# Patient Record
Sex: FEMALE | Race: WHITE | NOT HISPANIC OR LATINO | Employment: UNEMPLOYED | URBAN - NONMETROPOLITAN AREA
[De-identification: names, ages, dates, MRNs, and addresses within clinical notes are randomized per-mention and may not be internally consistent; named-entity substitution may affect disease eponyms.]

---

## 2020-06-05 ENCOUNTER — APPOINTMENT (OUTPATIENT)
Dept: MAMMOGRAPHY | Facility: HOSPITAL | Age: 60
End: 2020-06-05

## 2021-04-27 ENCOUNTER — HOSPITAL ENCOUNTER (EMERGENCY)
Facility: HOSPITAL | Age: 61
Discharge: HOME | End: 2021-04-27
Attending: FAMILY MEDICINE | Admitting: FAMILY MEDICINE

## 2021-04-27 ENCOUNTER — APPOINTMENT (OUTPATIENT)
Dept: GENERAL RADIOLOGY | Facility: HOSPITAL | Age: 61
End: 2021-04-27

## 2021-04-27 VITALS
DIASTOLIC BLOOD PRESSURE: 74 MMHG | HEIGHT: 64 IN | TEMPERATURE: 98.4 F | RESPIRATION RATE: 18 BRPM | HEART RATE: 68 BPM | WEIGHT: 196 LBS | SYSTOLIC BLOOD PRESSURE: 113 MMHG | BODY MASS INDEX: 33.46 KG/M2 | OXYGEN SATURATION: 99 %

## 2021-04-27 DIAGNOSIS — R07.89 CHEST WALL PAIN: Primary | ICD-10-CM

## 2021-04-27 LAB
ALBUMIN SERPL-MCNC: 4.39 G/DL (ref 3.5–5.2)
ALBUMIN/GLOB SERPL: 1.1 G/DL
ALP SERPL-CCNC: 71 U/L (ref 39–117)
ALT SERPL W P-5'-P-CCNC: 20 U/L (ref 1–33)
ANION GAP SERPL CALCULATED.3IONS-SCNC: 12.9 MMOL/L (ref 5–15)
APTT PPP: 32.5 SECONDS (ref 25.6–35.3)
AST SERPL-CCNC: 24 U/L (ref 1–32)
BASOPHILS # BLD AUTO: 0.08 10*3/MM3 (ref 0–0.2)
BASOPHILS NFR BLD AUTO: 1.2 % (ref 0–1.5)
BILIRUB SERPL-MCNC: 0.3 MG/DL (ref 0–1.2)
BUN SERPL-MCNC: 12 MG/DL (ref 8–23)
BUN/CREAT SERPL: 11.8 (ref 7–25)
CALCIUM SPEC-SCNC: 9.6 MG/DL (ref 8.6–10.5)
CHLORIDE SERPL-SCNC: 103 MMOL/L (ref 98–107)
CO2 SERPL-SCNC: 24.1 MMOL/L (ref 22–29)
CREAT SERPL-MCNC: 1.02 MG/DL (ref 0.57–1)
CRP SERPL-MCNC: 0.66 MG/DL (ref 0–0.5)
DEPRECATED RDW RBC AUTO: 44.7 FL (ref 37–54)
EOSINOPHIL # BLD AUTO: 0.13 10*3/MM3 (ref 0–0.4)
EOSINOPHIL NFR BLD AUTO: 2 % (ref 0.3–6.2)
ERYTHROCYTE [DISTWIDTH] IN BLOOD BY AUTOMATED COUNT: 13.9 % (ref 12.3–15.4)
GFR SERPL CREATININE-BSD FRML MDRD: 55 ML/MIN/1.73
GLOBULIN UR ELPH-MCNC: 4 GM/DL
GLUCOSE SERPL-MCNC: 122 MG/DL (ref 65–99)
HCT VFR BLD AUTO: 43.1 % (ref 34–46.6)
HGB BLD-MCNC: 13.4 G/DL (ref 12–15.9)
IMM GRANULOCYTES # BLD AUTO: 0.04 10*3/MM3 (ref 0–0.05)
IMM GRANULOCYTES NFR BLD AUTO: 0.6 % (ref 0–0.5)
INR PPP: 1.01 (ref 0.9–1.1)
LYMPHOCYTES # BLD AUTO: 2.11 10*3/MM3 (ref 0.7–3.1)
LYMPHOCYTES NFR BLD AUTO: 31.7 % (ref 19.6–45.3)
MCH RBC QN AUTO: 27.4 PG (ref 26.6–33)
MCHC RBC AUTO-ENTMCNC: 31.1 G/DL (ref 31.5–35.7)
MCV RBC AUTO: 88.1 FL (ref 79–97)
MONOCYTES # BLD AUTO: 0.37 10*3/MM3 (ref 0.1–0.9)
MONOCYTES NFR BLD AUTO: 5.6 % (ref 5–12)
NEUTROPHILS NFR BLD AUTO: 3.93 10*3/MM3 (ref 1.7–7)
NEUTROPHILS NFR BLD AUTO: 58.9 % (ref 42.7–76)
NRBC BLD AUTO-RTO: 0 /100 WBC (ref 0–0.2)
PLATELET # BLD AUTO: 376 10*3/MM3 (ref 140–450)
PMV BLD AUTO: 10.8 FL (ref 6–12)
POTASSIUM SERPL-SCNC: 4.1 MMOL/L (ref 3.5–5.2)
PROT SERPL-MCNC: 8.4 G/DL (ref 6–8.5)
PROTHROMBIN TIME: 13.1 SECONDS (ref 11.9–14.1)
RBC # BLD AUTO: 4.89 10*6/MM3 (ref 3.77–5.28)
SODIUM SERPL-SCNC: 140 MMOL/L (ref 136–145)
TROPONIN T SERPL-MCNC: <0.01 NG/ML (ref 0–0.03)
TROPONIN T SERPL-MCNC: <0.01 NG/ML (ref 0–0.03)
WBC # BLD AUTO: 6.66 10*3/MM3 (ref 3.4–10.8)

## 2021-04-27 PROCEDURE — 84484 ASSAY OF TROPONIN QUANT: CPT | Performed by: NURSE PRACTITIONER

## 2021-04-27 PROCEDURE — 80053 COMPREHEN METABOLIC PANEL: CPT | Performed by: NURSE PRACTITIONER

## 2021-04-27 PROCEDURE — 36415 COLL VENOUS BLD VENIPUNCTURE: CPT

## 2021-04-27 PROCEDURE — 71045 X-RAY EXAM CHEST 1 VIEW: CPT

## 2021-04-27 PROCEDURE — 86140 C-REACTIVE PROTEIN: CPT | Performed by: NURSE PRACTITIONER

## 2021-04-27 PROCEDURE — 71045 X-RAY EXAM CHEST 1 VIEW: CPT | Performed by: RADIOLOGY

## 2021-04-27 PROCEDURE — 85730 THROMBOPLASTIN TIME PARTIAL: CPT | Performed by: NURSE PRACTITIONER

## 2021-04-27 PROCEDURE — 93005 ELECTROCARDIOGRAM TRACING: CPT | Performed by: NURSE PRACTITIONER

## 2021-04-27 PROCEDURE — 99284 EMERGENCY DEPT VISIT MOD MDM: CPT

## 2021-04-27 PROCEDURE — 85610 PROTHROMBIN TIME: CPT | Performed by: NURSE PRACTITIONER

## 2021-04-27 PROCEDURE — 85025 COMPLETE CBC W/AUTO DIFF WBC: CPT | Performed by: NURSE PRACTITIONER

## 2021-04-27 RX ORDER — ASPIRIN 81 MG/1
324 TABLET, CHEWABLE ORAL ONCE
Status: COMPLETED | OUTPATIENT
Start: 2021-04-27 | End: 2021-04-27

## 2021-04-27 RX ORDER — SIMVASTATIN 20 MG
20 TABLET ORAL NIGHTLY
COMMUNITY
End: 2023-03-14

## 2021-04-27 RX ORDER — SODIUM CHLORIDE 0.9 % (FLUSH) 0.9 %
10 SYRINGE (ML) INJECTION AS NEEDED
Status: DISCONTINUED | OUTPATIENT
Start: 2021-04-27 | End: 2021-04-27 | Stop reason: HOSPADM

## 2021-04-27 RX ORDER — HYDROCODONE BITARTRATE AND ACETAMINOPHEN 5; 325 MG/1; MG/1
1 TABLET ORAL EVERY 6 HOURS PRN
COMMUNITY
End: 2021-12-07

## 2021-04-27 RX ORDER — MONTELUKAST SODIUM 10 MG/1
10 TABLET ORAL NIGHTLY
COMMUNITY

## 2021-04-27 RX ORDER — PROPRANOLOL HYDROCHLORIDE 20 MG/1
20 TABLET ORAL 3 TIMES DAILY
COMMUNITY

## 2021-04-27 RX ORDER — VORTIOXETINE 10 MG/1
1 TABLET, FILM COATED ORAL DAILY
COMMUNITY
End: 2023-03-14 | Stop reason: SDUPTHER

## 2021-04-27 RX ORDER — HYDROCODONE BITARTRATE AND ACETAMINOPHEN 5; 325 MG/1; MG/1
1 TABLET ORAL EVERY 6 HOURS PRN
Qty: 10 TABLET | Refills: 0 | Status: SHIPPED | OUTPATIENT
Start: 2021-04-27 | End: 2021-12-07

## 2021-04-27 RX ADMIN — ASPIRIN 324 MG: 81 TABLET, CHEWABLE ORAL at 10:23

## 2021-08-12 ENCOUNTER — TRANSCRIBE ORDERS (OUTPATIENT)
Dept: ADMINISTRATIVE | Facility: HOSPITAL | Age: 61
End: 2021-08-12

## 2021-08-12 DIAGNOSIS — R10.11 ABDOMINAL PAIN, RIGHT UPPER QUADRANT: Primary | ICD-10-CM

## 2021-08-20 ENCOUNTER — APPOINTMENT (OUTPATIENT)
Dept: ULTRASOUND IMAGING | Facility: HOSPITAL | Age: 61
End: 2021-08-20

## 2021-09-15 ENCOUNTER — HOSPITAL ENCOUNTER (OUTPATIENT)
Dept: ULTRASOUND IMAGING | Facility: HOSPITAL | Age: 61
Discharge: HOME | End: 2021-09-15
Admitting: NURSE PRACTITIONER

## 2021-09-15 DIAGNOSIS — R10.11 ABDOMINAL PAIN, RIGHT UPPER QUADRANT: ICD-10-CM

## 2021-09-15 PROCEDURE — 76705 ECHO EXAM OF ABDOMEN: CPT

## 2021-09-15 PROCEDURE — 76705 ECHO EXAM OF ABDOMEN: CPT | Performed by: RADIOLOGY

## 2021-09-28 ENCOUNTER — TRANSCRIBE ORDERS (OUTPATIENT)
Dept: ADMINISTRATIVE | Facility: HOSPITAL | Age: 61
End: 2021-09-28

## 2021-09-28 DIAGNOSIS — R10.11 ABDOMINAL PAIN, RIGHT UPPER QUADRANT: Primary | ICD-10-CM

## 2021-10-20 ENCOUNTER — HOSPITAL ENCOUNTER (OUTPATIENT)
Dept: NUCLEAR MEDICINE | Facility: HOSPITAL | Age: 61
Discharge: HOME | End: 2021-10-20

## 2021-10-20 DIAGNOSIS — R10.11 ABDOMINAL PAIN, RIGHT UPPER QUADRANT: ICD-10-CM

## 2021-10-20 PROCEDURE — A9537 TC99M MEBROFENIN: HCPCS | Performed by: NURSE PRACTITIONER

## 2021-10-20 PROCEDURE — 78226 HEPATOBILIARY SYSTEM IMAGING: CPT | Performed by: RADIOLOGY

## 2021-10-20 PROCEDURE — 0 TECHNETIUM TC 99M MEBROFENIN KIT: Performed by: NURSE PRACTITIONER

## 2021-10-20 PROCEDURE — 78226 HEPATOBILIARY SYSTEM IMAGING: CPT

## 2021-10-20 RX ORDER — KIT FOR THE PREPARATION OF TECHNETIUM TC 99M MEBROFENIN 45 MG/10ML
1 INJECTION, POWDER, LYOPHILIZED, FOR SOLUTION INTRAVENOUS
Status: COMPLETED | OUTPATIENT
Start: 2021-10-20 | End: 2021-10-20

## 2021-10-20 RX ADMIN — MEBROFENIN 1 DOSE: 45 INJECTION, POWDER, LYOPHILIZED, FOR SOLUTION INTRAVENOUS at 07:03

## 2021-11-16 ENCOUNTER — OFFICE VISIT (OUTPATIENT)
Dept: SURGERY | Facility: CLINIC | Age: 61
End: 2021-11-16

## 2021-11-16 VITALS
HEART RATE: 64 BPM | SYSTOLIC BLOOD PRESSURE: 132 MMHG | BODY MASS INDEX: 32.27 KG/M2 | DIASTOLIC BLOOD PRESSURE: 92 MMHG | WEIGHT: 189 LBS | HEIGHT: 64 IN

## 2021-11-16 DIAGNOSIS — K82.8 BILIARY DYSKINESIA: Primary | ICD-10-CM

## 2021-11-16 PROCEDURE — 99203 OFFICE O/P NEW LOW 30 MIN: CPT | Performed by: SURGERY

## 2021-11-16 RX ORDER — FAMOTIDINE 10 MG
10 TABLET ORAL 2 TIMES DAILY
COMMUNITY
End: 2023-03-14

## 2021-11-16 RX ORDER — INDOCYANINE GREEN AND WATER 25 MG
2.5 KIT INJECTION ONCE
Status: CANCELLED | OUTPATIENT
Start: 2021-11-16 | End: 2021-11-16

## 2021-11-16 RX ORDER — LOSARTAN POTASSIUM 25 MG/1
25 TABLET ORAL DAILY
Status: ON HOLD | COMMUNITY
Start: 2021-11-13 | End: 2021-12-09

## 2021-11-16 NOTE — PROGRESS NOTES
Subjective   Rosemarie Wright is a 61 y.o. female is being seen for consultation today at the request of Carey Angeles APRN    Rosemarie Wright is a 61 y.o. female With right upper quadrant pain after fatty and greasy meals.  Symptoms been present intermittently for 1 year.  Symptoms are increasing in frequency and duration.  No signs of cholecystitis.  HIDA scan shows decreased ejection fraction below 35%.      Past Medical History:   Diagnosis Date   • Anxiety    • Hyperlipidemia    • Hypertension    • Migraine    • Sleep apnea        History reviewed. No pertinent family history.    Social History     Socioeconomic History   • Marital status:    Tobacco Use   • Smoking status: Never Smoker   • Smokeless tobacco: Never Used   Vaping Use   • Vaping Use: Never used   Substance and Sexual Activity   • Alcohol use: Never   • Drug use: Never   • Sexual activity: Defer       History reviewed. No pertinent surgical history.    Review of Systems   Constitutional: Negative for activity change, appetite change, chills and fever.   HENT: Negative for sore throat and trouble swallowing.    Eyes: Negative for visual disturbance.   Respiratory: Negative for cough and shortness of breath.    Cardiovascular: Negative for chest pain and palpitations.   Gastrointestinal: Positive for abdominal pain and nausea. Negative for abdominal distention, blood in stool, constipation, diarrhea and vomiting.   Endocrine: Negative for cold intolerance and heat intolerance.   Genitourinary: Negative for dysuria.   Musculoskeletal: Negative for joint swelling.   Skin: Negative for color change, rash and wound.   Allergic/Immunologic: Negative for immunocompromised state.   Neurological: Negative for dizziness, seizures, weakness and headaches.   Hematological: Negative for adenopathy. Does not bruise/bleed easily.   Psychiatric/Behavioral: Negative for agitation and confusion.         /92   Pulse 64   Ht 162.6 cm  "(64.02\")   Wt 85.7 kg (189 lb)   BMI 32.43 kg/m²   Objective   Physical Exam  Constitutional:       Appearance: She is well-developed.   HENT:      Head: Normocephalic and atraumatic.   Eyes:      Conjunctiva/sclera: Conjunctivae normal.      Pupils: Pupils are equal, round, and reactive to light.   Neck:      Thyroid: No thyromegaly.      Vascular: No JVD.      Trachea: No tracheal deviation.   Cardiovascular:      Rate and Rhythm: Normal rate and regular rhythm.      Heart sounds: No murmur heard.  No friction rub. No gallop.    Pulmonary:      Effort: Pulmonary effort is normal.      Breath sounds: Normal breath sounds.   Abdominal:      General: There is no distension.      Palpations: Abdomen is soft. There is no hepatomegaly or splenomegaly.      Tenderness: There is no abdominal tenderness.      Hernia: No hernia is present.   Musculoskeletal:         General: No deformity. Normal range of motion.      Cervical back: Neck supple.   Skin:     General: Skin is warm and dry.   Neurological:      Mental Status: She is alert and oriented to person, place, and time.               Assessment   Diagnoses and all orders for this visit:    1. Biliary dyskinesia (Primary)  -     Case Request; Standing  -     COVID PRE-OP / PRE-PROCEDURE SCREENING ORDER (NO ISOLATION) - Swab, Nasopharynx; Future  -     CBC & Differential; Future  -     Comprehensive Metabolic Panel; Future  -     Case Request    Other orders  -     Follow Anesthesia Guidelines / Standing Orders; Future  -     Provide NPO Instructions to Patient; Future  -     Chlorhexidine Skin Prep; Future      Rosemarie Edwards Kyle is a 61 y.o. female with biliary dyskinesia.  Risk and benefits have been discussed with the patient and she will proceed with cholecystectomy.    Patient's Body mass index is 32.43 kg/m². indicating that she is within normal range (BMI 18.5-24.9). No BMI management plan needed..             "

## 2021-12-02 ENCOUNTER — TELEPHONE (OUTPATIENT)
Dept: SURGERY | Facility: CLINIC | Age: 61
End: 2021-12-02

## 2021-12-02 NOTE — TELEPHONE ENCOUNTER
[facility] Call Center    Phone Message    May a detailed message be left on voicemail: yes     Reason for Call: Medication Question or concern regarding medication   Prescription Clarification  Name of Medication: Gabapentin  Prescribing Provider:    What on the order needs clarification? Pt's group home nurse called to clarify the dosage for the gabapentin          Action Taken: Other: [facility]    Travel Screening: Not Applicable                                                                                                                                  Talked with patient about surgery time preop and covid testing , patient voiced understanding.

## 2021-12-07 ENCOUNTER — LAB (OUTPATIENT)
Dept: LAB | Facility: HOSPITAL | Age: 61
End: 2021-12-07

## 2021-12-07 ENCOUNTER — PRE-ADMISSION TESTING (OUTPATIENT)
Dept: PREADMISSION TESTING | Facility: HOSPITAL | Age: 61
End: 2021-12-07

## 2021-12-07 DIAGNOSIS — K82.8 BILIARY DYSKINESIA: ICD-10-CM

## 2021-12-07 LAB
ALBUMIN SERPL-MCNC: 4.6 G/DL (ref 3.5–5.2)
ALBUMIN/GLOB SERPL: 1.2 G/DL
ALP SERPL-CCNC: 74 U/L (ref 39–117)
ALT SERPL W P-5'-P-CCNC: 14 U/L (ref 1–33)
ANION GAP SERPL CALCULATED.3IONS-SCNC: 12.8 MMOL/L (ref 5–15)
AST SERPL-CCNC: 18 U/L (ref 1–32)
BASOPHILS # BLD AUTO: 0.12 10*3/MM3 (ref 0–0.2)
BASOPHILS NFR BLD AUTO: 1.4 % (ref 0–1.5)
BILIRUB SERPL-MCNC: 0.3 MG/DL (ref 0–1.2)
BUN SERPL-MCNC: 15 MG/DL (ref 8–23)
BUN/CREAT SERPL: 14.7 (ref 7–25)
CALCIUM SPEC-SCNC: 10.1 MG/DL (ref 8.6–10.5)
CHLORIDE SERPL-SCNC: 101 MMOL/L (ref 98–107)
CO2 SERPL-SCNC: 26.2 MMOL/L (ref 22–29)
CREAT SERPL-MCNC: 1.02 MG/DL (ref 0.57–1)
DEPRECATED RDW RBC AUTO: 43.1 FL (ref 37–54)
EOSINOPHIL # BLD AUTO: 0.21 10*3/MM3 (ref 0–0.4)
EOSINOPHIL NFR BLD AUTO: 2.4 % (ref 0.3–6.2)
ERYTHROCYTE [DISTWIDTH] IN BLOOD BY AUTOMATED COUNT: 13.2 % (ref 12.3–15.4)
GFR SERPL CREATININE-BSD FRML MDRD: 55 ML/MIN/1.73
GLOBULIN UR ELPH-MCNC: 3.7 GM/DL
GLUCOSE SERPL-MCNC: 77 MG/DL (ref 65–99)
HCT VFR BLD AUTO: 41.9 % (ref 34–46.6)
HGB BLD-MCNC: 13.4 G/DL (ref 12–15.9)
IMM GRANULOCYTES # BLD AUTO: 0.02 10*3/MM3 (ref 0–0.05)
IMM GRANULOCYTES NFR BLD AUTO: 0.2 % (ref 0–0.5)
LYMPHOCYTES # BLD AUTO: 2.41 10*3/MM3 (ref 0.7–3.1)
LYMPHOCYTES NFR BLD AUTO: 27.5 % (ref 19.6–45.3)
MCH RBC QN AUTO: 28.3 PG (ref 26.6–33)
MCHC RBC AUTO-ENTMCNC: 32 G/DL (ref 31.5–35.7)
MCV RBC AUTO: 88.4 FL (ref 79–97)
MONOCYTES # BLD AUTO: 0.55 10*3/MM3 (ref 0.1–0.9)
MONOCYTES NFR BLD AUTO: 6.3 % (ref 5–12)
NEUTROPHILS NFR BLD AUTO: 5.45 10*3/MM3 (ref 1.7–7)
NEUTROPHILS NFR BLD AUTO: 62.2 % (ref 42.7–76)
NRBC BLD AUTO-RTO: 0 /100 WBC (ref 0–0.2)
PLATELET # BLD AUTO: 352 10*3/MM3 (ref 140–450)
PMV BLD AUTO: 11.6 FL (ref 6–12)
POTASSIUM SERPL-SCNC: 3.8 MMOL/L (ref 3.5–5.2)
PROT SERPL-MCNC: 8.3 G/DL (ref 6–8.5)
RBC # BLD AUTO: 4.74 10*6/MM3 (ref 3.77–5.28)
SODIUM SERPL-SCNC: 140 MMOL/L (ref 136–145)
WBC NRBC COR # BLD: 8.76 10*3/MM3 (ref 3.4–10.8)

## 2021-12-07 PROCEDURE — 85025 COMPLETE CBC W/AUTO DIFF WBC: CPT

## 2021-12-07 PROCEDURE — C9803 HOPD COVID-19 SPEC COLLECT: HCPCS

## 2021-12-07 PROCEDURE — 93005 ELECTROCARDIOGRAM TRACING: CPT

## 2021-12-07 PROCEDURE — 36415 COLL VENOUS BLD VENIPUNCTURE: CPT

## 2021-12-07 PROCEDURE — 93010 ELECTROCARDIOGRAM REPORT: CPT | Performed by: INTERNAL MEDICINE

## 2021-12-07 PROCEDURE — 80053 COMPREHEN METABOLIC PANEL: CPT

## 2021-12-07 PROCEDURE — U0004 COV-19 TEST NON-CDC HGH THRU: HCPCS | Performed by: SURGERY

## 2021-12-07 NOTE — DISCHARGE INSTRUCTIONS
TAKE the following medications the morning of surgery:    All heart or blood pressure medications    Please discontinue all blood thinners and anticoagulants (except aspirin) prior to surgery as per your surgeon and cardiologist instructions.  Aspirin may be continued up to the day prior to surgery.    HOLD all diabetic medications the morning of surgery as order by physician.    Please follow instructions on use of prep cloths provided by nurse. Return instruction sheet to pre-op nurse on day of surgery.    Arrival time for surgery on 12/9/21 will be given to you by Dr. Wong's office.    A RESPONSIBLE PERSON MUST REMAIN IN THE WAITING ROOM DURING YOUR PROCEDURE AND A RESPONSIBLE  MUST BE AVAILABLE UPON YOUR DISCHARGE.    General Instructions:  • Do NOT eat or drink after midnight 12/8/21 which includes water, mints, or gum.  • You may brush your teeth. Dental appliances that are removable must be taken out day of surgery.  • Do NOT smoke, chew tobacco, or drink alcohol within 24 hours prior to surgery.  • Bring medications in original bottles, any inhalers and if applicable your C-PAP/BI-PAP machine  • Bring any papers given to you in the doctor’s office  • Wear clean, comfortable clothes and socks  • Do NOT wear contact lenses or make-up or dark nail polish.  Bring a case for your glasses if applicable.  • Bring crutches or walker if applicable  • Leave all other valuables and jewelry at home  • If you were given a blood bank armband, continue to wear it until discharged.    Preventing a Surgical Site Infection:  • Shower the night before surgery (unless instructed otherwise) using a fresh bar of anti-bacterial soap (such as Dial) and clean washcloth.  Dry with a clean towel and dress in clean clothing.  • For 2 to 3 days before surgery, avoid shaving with a razor near where you will have surgery because the razor can irritate skin and make it easier to develop an infection.  Ask your surgeon if you will be  receiving antibiotics prior to surgery.  • Make sure you, your family, and all healthcare providers clean their hands with soap and water or an alcohol-based hand  before caring for you or your wound.  • If at all possible, quit smoking as many days before surgery as you can.    Day of Surgery:  Upon arrival, a pre-op nurse and anesthesiologist will review your health history, obtain vital signs, and answer questions you may have.  The only belongings needed at this time will be your home medications and if applicable you C-PAP/BI-PAP machine.  If you are staying overnight, your family can leave the rest of your belongings in the car and bring them to your room later.  A pre-op nurse will start an IV and you may receive medication in preparation for surgery.  Due to patient privacy and limited space, only one member of your family will be able to accompany you in the pre-op area.  While you are in surgery your family should notify the waiting room  if they leave the waiting room area and provide a contact number.  Please be aware that surgery does come with discomfort.  We want to make every effort to control your discomfort so please discuss any uncontrolled symptoms with your nurse.  Your doctor will most likely have prescribed pain medications.  If you are going home after surgery you will receive individualized written care instructions before being discharged.  A responsible adult must drive you to and from the hospital on the day of surgery and stay with you for 24 hours.  If you are staying overnight following surgery, you will be transported to your hospital room following the recovery period.

## 2021-12-08 LAB
QT INTERVAL: 436 MS
QTC INTERVAL: 470 MS
SARS-COV-2 RNA PNL SPEC NAA+PROBE: NOT DETECTED

## 2021-12-09 ENCOUNTER — HOSPITAL ENCOUNTER (OUTPATIENT)
Facility: HOSPITAL | Age: 61
Discharge: HOME | End: 2021-12-10
Attending: SURGERY | Admitting: SURGERY

## 2021-12-09 ENCOUNTER — ANESTHESIA (OUTPATIENT)
Dept: PERIOP | Facility: HOSPITAL | Age: 61
End: 2021-12-09

## 2021-12-09 ENCOUNTER — APPOINTMENT (OUTPATIENT)
Dept: GENERAL RADIOLOGY | Facility: HOSPITAL | Age: 61
End: 2021-12-09

## 2021-12-09 ENCOUNTER — ANESTHESIA EVENT (OUTPATIENT)
Dept: PERIOP | Facility: HOSPITAL | Age: 61
End: 2021-12-09

## 2021-12-09 DIAGNOSIS — K82.8 BILIARY DYSKINESIA: ICD-10-CM

## 2021-12-09 LAB
A-A DO2: 15.5 MMHG (ref 0–300)
ARTERIAL PATENCY WRIST A: POSITIVE
ATMOSPHERIC PRESS: 725 MMHG
BASE EXCESS BLDA CALC-SCNC: -1.1 MMOL/L (ref 0–2)
BDY SITE: ABNORMAL
BODY TEMPERATURE: 0 C
CO2 BLDA-SCNC: 24.4 MMOL/L (ref 22–33)
COHGB MFR BLD: 0.6 % (ref 0–5)
GAS FLOW AIRWAY: 2 LPM
HCO3 BLDA-SCNC: 23.2 MMOL/L (ref 20–26)
HCT VFR BLD CALC: 37.1 % (ref 38–51)
HGB BLDA-MCNC: 12.1 G/DL (ref 13.5–17.5)
INHALED O2 CONCENTRATION: 28 %
Lab: ABNORMAL
METHGB BLD QL: 0.1 % (ref 0–3)
MODALITY: ABNORMAL
NOTE: ABNORMAL
OXYHGB MFR BLDV: 98.7 % (ref 94–99)
PCO2 BLDA: 36.9 MM HG (ref 35–45)
PCO2 TEMP ADJ BLD: ABNORMAL MM[HG]
PH BLDA: 7.41 PH UNITS (ref 7.35–7.45)
PH, TEMP CORRECTED: ABNORMAL
PO2 BLDA: 133 MM HG (ref 83–108)
PO2 TEMP ADJ BLD: ABNORMAL MM[HG]
SAO2 % BLDCOA: >99.2 % (ref 94–99)
VENTILATOR MODE: ABNORMAL

## 2021-12-09 PROCEDURE — 25010000002 HYDROMORPHONE PER 4 MG: Performed by: NURSE ANESTHETIST, CERTIFIED REGISTERED

## 2021-12-09 PROCEDURE — 25010000002 PROPOFOL 10 MG/ML EMULSION: Performed by: NURSE ANESTHETIST, CERTIFIED REGISTERED

## 2021-12-09 PROCEDURE — 25010000002 BUPRENORPHINE PER 0.1 MG: Performed by: NURSE ANESTHETIST, CERTIFIED REGISTERED

## 2021-12-09 PROCEDURE — 25010000002 ROPIVACAINE PER 1 MG: Performed by: NURSE ANESTHETIST, CERTIFIED REGISTERED

## 2021-12-09 PROCEDURE — 25010000002 FENTANYL CITRATE (PF) 50 MCG/ML SOLUTION: Performed by: NURSE ANESTHETIST, CERTIFIED REGISTERED

## 2021-12-09 PROCEDURE — 0 AMPICILLIN-SULBACTAM PER 1.5 G: Performed by: SURGERY

## 2021-12-09 PROCEDURE — 25010000002 MIDAZOLAM PER 1 MG: Performed by: ANESTHESIOLOGY

## 2021-12-09 PROCEDURE — 82805 BLOOD GASES W/O2 SATURATION: CPT

## 2021-12-09 PROCEDURE — G0378 HOSPITAL OBSERVATION PER HR: HCPCS

## 2021-12-09 PROCEDURE — 82375 ASSAY CARBOXYHB QUANT: CPT

## 2021-12-09 PROCEDURE — 25010000002 NALOXONE PER 1 MG: Performed by: ANESTHESIOLOGY

## 2021-12-09 PROCEDURE — 36600 WITHDRAWAL OF ARTERIAL BLOOD: CPT

## 2021-12-09 PROCEDURE — 83050 HGB METHEMOGLOBIN QUAN: CPT

## 2021-12-09 PROCEDURE — 25010000002 NEOSTIGMINE 10 MG/10ML SOLUTION: Performed by: NURSE ANESTHETIST, CERTIFIED REGISTERED

## 2021-12-09 PROCEDURE — 25010000002 KETOROLAC TROMETHAMINE PER 15 MG: Performed by: NURSE ANESTHETIST, CERTIFIED REGISTERED

## 2021-12-09 PROCEDURE — 25010000002 MIDAZOLAM PER 1 MG: Performed by: NURSE ANESTHETIST, CERTIFIED REGISTERED

## 2021-12-09 PROCEDURE — 25010000002 ONDANSETRON PER 1 MG: Performed by: NURSE ANESTHETIST, CERTIFIED REGISTERED

## 2021-12-09 PROCEDURE — 47562 LAPAROSCOPIC CHOLECYSTECTOMY: CPT | Performed by: SURGERY

## 2021-12-09 PROCEDURE — 71045 X-RAY EXAM CHEST 1 VIEW: CPT

## 2021-12-09 PROCEDURE — C1889 IMPLANT/INSERT DEVICE, NOC: HCPCS | Performed by: SURGERY

## 2021-12-09 PROCEDURE — 25010000002 DROPERIDOL PER 5 MG: Performed by: NURSE ANESTHETIST, CERTIFIED REGISTERED

## 2021-12-09 PROCEDURE — 25010000002 DEXAMETHASONE PER 1 MG: Performed by: NURSE ANESTHETIST, CERTIFIED REGISTERED

## 2021-12-09 DEVICE — HEMOLOK L 6 CLIPS/CART
Type: IMPLANTABLE DEVICE | Site: ABDOMEN | Status: FUNCTIONAL
Brand: WECK

## 2021-12-09 RX ORDER — FENTANYL CITRATE 50 UG/ML
50 INJECTION, SOLUTION INTRAMUSCULAR; INTRAVENOUS
Status: DISCONTINUED | OUTPATIENT
Start: 2021-12-09 | End: 2021-12-09 | Stop reason: HOSPADM

## 2021-12-09 RX ORDER — MEPERIDINE HYDROCHLORIDE 25 MG/ML
12.5 INJECTION INTRAMUSCULAR; INTRAVENOUS; SUBCUTANEOUS
Status: DISCONTINUED | OUTPATIENT
Start: 2021-12-09 | End: 2021-12-09 | Stop reason: HOSPADM

## 2021-12-09 RX ORDER — KETOROLAC TROMETHAMINE 30 MG/ML
30 INJECTION, SOLUTION INTRAMUSCULAR; INTRAVENOUS EVERY 6 HOURS PRN
Status: COMPLETED | OUTPATIENT
Start: 2021-12-09 | End: 2021-12-09

## 2021-12-09 RX ORDER — PROPOFOL 10 MG/ML
VIAL (ML) INTRAVENOUS AS NEEDED
Status: DISCONTINUED | OUTPATIENT
Start: 2021-12-09 | End: 2021-12-09 | Stop reason: SURG

## 2021-12-09 RX ORDER — CYCLOBENZAPRINE HCL 10 MG
5 TABLET ORAL 3 TIMES DAILY PRN
Status: DISCONTINUED | OUTPATIENT
Start: 2021-12-09 | End: 2021-12-10 | Stop reason: HOSPADM

## 2021-12-09 RX ORDER — OXYCODONE HYDROCHLORIDE AND ACETAMINOPHEN 5; 325 MG/1; MG/1
1 TABLET ORAL ONCE AS NEEDED
Status: COMPLETED | OUTPATIENT
Start: 2021-12-09 | End: 2021-12-09

## 2021-12-09 RX ORDER — SODIUM CHLORIDE 0.9 % (FLUSH) 0.9 %
10 SYRINGE (ML) INJECTION EVERY 12 HOURS SCHEDULED
Status: DISCONTINUED | OUTPATIENT
Start: 2021-12-09 | End: 2021-12-09 | Stop reason: HOSPADM

## 2021-12-09 RX ORDER — ONDANSETRON 2 MG/ML
4 INJECTION INTRAMUSCULAR; INTRAVENOUS EVERY 6 HOURS PRN
Status: DISCONTINUED | OUTPATIENT
Start: 2021-12-09 | End: 2021-12-10 | Stop reason: HOSPADM

## 2021-12-09 RX ORDER — SODIUM CHLORIDE, SODIUM LACTATE, POTASSIUM CHLORIDE, CALCIUM CHLORIDE 600; 310; 30; 20 MG/100ML; MG/100ML; MG/100ML; MG/100ML
100 INJECTION, SOLUTION INTRAVENOUS ONCE AS NEEDED
Status: DISCONTINUED | OUTPATIENT
Start: 2021-12-09 | End: 2021-12-09 | Stop reason: HOSPADM

## 2021-12-09 RX ORDER — ROPIVACAINE HYDROCHLORIDE 5 MG/ML
INJECTION, SOLUTION EPIDURAL; INFILTRATION; PERINEURAL
Status: COMPLETED | OUTPATIENT
Start: 2021-12-09 | End: 2021-12-09

## 2021-12-09 RX ORDER — IBUPROFEN 600 MG/1
600 TABLET ORAL EVERY 4 HOURS PRN
Status: DISCONTINUED | OUTPATIENT
Start: 2021-12-09 | End: 2021-12-10 | Stop reason: HOSPADM

## 2021-12-09 RX ORDER — INDOCYANINE GREEN AND WATER 25 MG
2.5 KIT INJECTION ONCE
Status: COMPLETED | OUTPATIENT
Start: 2021-12-09 | End: 2021-12-09

## 2021-12-09 RX ORDER — SODIUM CHLORIDE, SODIUM LACTATE, POTASSIUM CHLORIDE, CALCIUM CHLORIDE 600; 310; 30; 20 MG/100ML; MG/100ML; MG/100ML; MG/100ML
125 INJECTION, SOLUTION INTRAVENOUS ONCE
Status: COMPLETED | OUTPATIENT
Start: 2021-12-09 | End: 2021-12-09

## 2021-12-09 RX ORDER — MIDAZOLAM HYDROCHLORIDE 1 MG/ML
INJECTION INTRAMUSCULAR; INTRAVENOUS AS NEEDED
Status: DISCONTINUED | OUTPATIENT
Start: 2021-12-09 | End: 2021-12-09 | Stop reason: SURG

## 2021-12-09 RX ORDER — FAMOTIDINE 20 MG/1
10 TABLET, FILM COATED ORAL 2 TIMES DAILY
Status: DISCONTINUED | OUTPATIENT
Start: 2021-12-10 | End: 2021-12-10 | Stop reason: HOSPADM

## 2021-12-09 RX ORDER — NALOXONE HYDROCHLORIDE 0.4 MG/ML
0.1 INJECTION, SOLUTION INTRAMUSCULAR; INTRAVENOUS; SUBCUTANEOUS ONCE
Status: COMPLETED | OUTPATIENT
Start: 2021-12-09 | End: 2021-12-09

## 2021-12-09 RX ORDER — ACETAMINOPHEN 325 MG/1
650 TABLET ORAL EVERY 4 HOURS PRN
Status: DISCONTINUED | OUTPATIENT
Start: 2021-12-09 | End: 2021-12-10 | Stop reason: HOSPADM

## 2021-12-09 RX ORDER — ACETAMINOPHEN 325 MG/1
650 TABLET ORAL EVERY 4 HOURS PRN
Qty: 30 TABLET | Refills: 0 | Status: SHIPPED | OUTPATIENT
Start: 2021-12-09 | End: 2023-03-14 | Stop reason: SDUPTHER

## 2021-12-09 RX ORDER — SODIUM CHLORIDE 0.9 % (FLUSH) 0.9 %
10 SYRINGE (ML) INJECTION AS NEEDED
Status: DISCONTINUED | OUTPATIENT
Start: 2021-12-09 | End: 2021-12-09 | Stop reason: HOSPADM

## 2021-12-09 RX ORDER — TRAMADOL HYDROCHLORIDE 50 MG/1
50 TABLET ORAL EVERY 8 HOURS PRN
Qty: 8 TABLET | Refills: 0 | Status: SHIPPED | OUTPATIENT
Start: 2021-12-09 | End: 2023-03-14 | Stop reason: SDUPTHER

## 2021-12-09 RX ORDER — IBUPROFEN 600 MG/1
600 TABLET ORAL EVERY 6 HOURS PRN
Qty: 30 TABLET | Refills: 0 | Status: SHIPPED | OUTPATIENT
Start: 2021-12-09 | End: 2023-03-14 | Stop reason: SDUPTHER

## 2021-12-09 RX ORDER — MIDAZOLAM HYDROCHLORIDE 1 MG/ML
1 INJECTION INTRAMUSCULAR; INTRAVENOUS
Status: DISCONTINUED | OUTPATIENT
Start: 2021-12-09 | End: 2021-12-09 | Stop reason: HOSPADM

## 2021-12-09 RX ORDER — GLYCOPYRROLATE 0.2 MG/ML
INJECTION INTRAMUSCULAR; INTRAVENOUS AS NEEDED
Status: DISCONTINUED | OUTPATIENT
Start: 2021-12-09 | End: 2021-12-09 | Stop reason: SURG

## 2021-12-09 RX ORDER — HYDROCODONE BITARTRATE AND ACETAMINOPHEN 5; 325 MG/1; MG/1
1 TABLET ORAL EVERY 6 HOURS PRN
Status: DISCONTINUED | OUTPATIENT
Start: 2021-12-09 | End: 2021-12-10 | Stop reason: HOSPADM

## 2021-12-09 RX ORDER — SUMATRIPTAN 50 MG/1
50 TABLET, FILM COATED ORAL
Status: DISCONTINUED | OUTPATIENT
Start: 2021-12-09 | End: 2021-12-10 | Stop reason: HOSPADM

## 2021-12-09 RX ORDER — FAMOTIDINE 10 MG/ML
INJECTION, SOLUTION INTRAVENOUS AS NEEDED
Status: DISCONTINUED | OUTPATIENT
Start: 2021-12-09 | End: 2021-12-09 | Stop reason: SURG

## 2021-12-09 RX ORDER — MAGNESIUM HYDROXIDE 1200 MG/15ML
LIQUID ORAL AS NEEDED
Status: DISCONTINUED | OUTPATIENT
Start: 2021-12-09 | End: 2021-12-09 | Stop reason: HOSPADM

## 2021-12-09 RX ORDER — IPRATROPIUM BROMIDE AND ALBUTEROL SULFATE 2.5; .5 MG/3ML; MG/3ML
3 SOLUTION RESPIRATORY (INHALATION) ONCE AS NEEDED
Status: DISCONTINUED | OUTPATIENT
Start: 2021-12-09 | End: 2021-12-09 | Stop reason: HOSPADM

## 2021-12-09 RX ORDER — FENTANYL CITRATE 50 UG/ML
INJECTION, SOLUTION INTRAMUSCULAR; INTRAVENOUS AS NEEDED
Status: DISCONTINUED | OUTPATIENT
Start: 2021-12-09 | End: 2021-12-09 | Stop reason: SURG

## 2021-12-09 RX ORDER — SODIUM CHLORIDE 9 MG/ML
INJECTION, SOLUTION INTRAVENOUS AS NEEDED
Status: DISCONTINUED | OUTPATIENT
Start: 2021-12-09 | End: 2021-12-09 | Stop reason: HOSPADM

## 2021-12-09 RX ORDER — DROPERIDOL 2.5 MG/ML
0.62 INJECTION, SOLUTION INTRAMUSCULAR; INTRAVENOUS ONCE AS NEEDED
Status: COMPLETED | OUTPATIENT
Start: 2021-12-09 | End: 2021-12-09

## 2021-12-09 RX ORDER — ATORVASTATIN CALCIUM 10 MG/1
10 TABLET, FILM COATED ORAL DAILY
Status: DISCONTINUED | OUTPATIENT
Start: 2021-12-11 | End: 2021-12-10 | Stop reason: HOSPADM

## 2021-12-09 RX ORDER — LIDOCAINE HYDROCHLORIDE 20 MG/ML
INJECTION, SOLUTION INFILTRATION; PERINEURAL AS NEEDED
Status: DISCONTINUED | OUTPATIENT
Start: 2021-12-09 | End: 2021-12-09 | Stop reason: SURG

## 2021-12-09 RX ORDER — CYCLOBENZAPRINE HCL 5 MG
5 TABLET ORAL 3 TIMES DAILY PRN
COMMUNITY

## 2021-12-09 RX ORDER — ONDANSETRON 2 MG/ML
INJECTION INTRAMUSCULAR; INTRAVENOUS AS NEEDED
Status: DISCONTINUED | OUTPATIENT
Start: 2021-12-09 | End: 2021-12-09 | Stop reason: SURG

## 2021-12-09 RX ORDER — DEXAMETHASONE SODIUM PHOSPHATE 4 MG/ML
INJECTION, SOLUTION INTRA-ARTICULAR; INTRALESIONAL; INTRAMUSCULAR; INTRAVENOUS; SOFT TISSUE
Status: COMPLETED | OUTPATIENT
Start: 2021-12-09 | End: 2021-12-09

## 2021-12-09 RX ORDER — NEOSTIGMINE METHYLSULFATE 1 MG/ML
INJECTION, SOLUTION INTRAVENOUS AS NEEDED
Status: DISCONTINUED | OUTPATIENT
Start: 2021-12-09 | End: 2021-12-09 | Stop reason: SURG

## 2021-12-09 RX ORDER — LOSARTAN POTASSIUM AND HYDROCHLOROTHIAZIDE 12.5; 5 MG/1; MG/1
1 TABLET ORAL DAILY
COMMUNITY

## 2021-12-09 RX ORDER — SUMATRIPTAN 50 MG/1
50 TABLET, FILM COATED ORAL
COMMUNITY

## 2021-12-09 RX ORDER — PROPRANOLOL HYDROCHLORIDE 20 MG/1
20 TABLET ORAL 3 TIMES DAILY
Status: DISCONTINUED | OUTPATIENT
Start: 2021-12-10 | End: 2021-12-10 | Stop reason: HOSPADM

## 2021-12-09 RX ORDER — ROCURONIUM BROMIDE 10 MG/ML
INJECTION, SOLUTION INTRAVENOUS AS NEEDED
Status: DISCONTINUED | OUTPATIENT
Start: 2021-12-09 | End: 2021-12-09 | Stop reason: SURG

## 2021-12-09 RX ORDER — BUPRENORPHINE HYDROCHLORIDE 0.32 MG/ML
INJECTION INTRAMUSCULAR; INTRAVENOUS
Status: COMPLETED | OUTPATIENT
Start: 2021-12-09 | End: 2021-12-09

## 2021-12-09 RX ORDER — ONDANSETRON 2 MG/ML
4 INJECTION INTRAMUSCULAR; INTRAVENOUS AS NEEDED
Status: DISCONTINUED | OUTPATIENT
Start: 2021-12-09 | End: 2021-12-09 | Stop reason: HOSPADM

## 2021-12-09 RX ORDER — MONTELUKAST SODIUM 10 MG/1
10 TABLET ORAL NIGHTLY
Status: DISCONTINUED | OUTPATIENT
Start: 2021-12-10 | End: 2021-12-10 | Stop reason: HOSPADM

## 2021-12-09 RX ORDER — HYDROMORPHONE HCL 110MG/55ML
PATIENT CONTROLLED ANALGESIA SYRINGE INTRAVENOUS AS NEEDED
Status: DISCONTINUED | OUTPATIENT
Start: 2021-12-09 | End: 2021-12-09 | Stop reason: SURG

## 2021-12-09 RX ADMIN — SODIUM CHLORIDE, POTASSIUM CHLORIDE, SODIUM LACTATE AND CALCIUM CHLORIDE 125 ML/HR: 600; 310; 30; 20 INJECTION, SOLUTION INTRAVENOUS at 13:38

## 2021-12-09 RX ADMIN — INDOCYANINE GREEN AND WATER 2.5 MG: KIT at 13:38

## 2021-12-09 RX ADMIN — HYDROMORPHONE HYDROCHLORIDE 0.5 MG: 2 INJECTION, SOLUTION INTRAMUSCULAR; INTRAVENOUS; SUBCUTANEOUS at 16:25

## 2021-12-09 RX ADMIN — HYDROMORPHONE HYDROCHLORIDE 0.5 MG: 2 INJECTION, SOLUTION INTRAMUSCULAR; INTRAVENOUS; SUBCUTANEOUS at 16:18

## 2021-12-09 RX ADMIN — DROPERIDOL 0.62 MG: 2.5 INJECTION, SOLUTION INTRAMUSCULAR; INTRAVENOUS at 17:47

## 2021-12-09 RX ADMIN — MIDAZOLAM 1 MG: 1 INJECTION INTRAMUSCULAR; INTRAVENOUS at 13:58

## 2021-12-09 RX ADMIN — ONDANSETRON 4 MG: 2 INJECTION INTRAMUSCULAR; INTRAVENOUS at 16:03

## 2021-12-09 RX ADMIN — HYDROMORPHONE HYDROCHLORIDE 0.5 MG: 2 INJECTION, SOLUTION INTRAMUSCULAR; INTRAVENOUS; SUBCUTANEOUS at 16:29

## 2021-12-09 RX ADMIN — MIDAZOLAM 1 MG: 1 INJECTION INTRAMUSCULAR; INTRAVENOUS at 14:09

## 2021-12-09 RX ADMIN — BUPRENORPHINE HYDROCHLORIDE 0.3 MG: 0.32 INJECTION INTRAMUSCULAR; INTRAVENOUS at 16:01

## 2021-12-09 RX ADMIN — GLYCOPYRROLATE 0.4 MG: 0.2 INJECTION, SOLUTION INTRAMUSCULAR; INTRAVENOUS at 16:11

## 2021-12-09 RX ADMIN — FENTANYL CITRATE 100 MCG: 50 INJECTION INTRAMUSCULAR; INTRAVENOUS at 15:37

## 2021-12-09 RX ADMIN — ROPIVACAINE HYDROCHLORIDE 250 MG: 5 INJECTION, SOLUTION EPIDURAL; INFILTRATION; PERINEURAL at 16:01

## 2021-12-09 RX ADMIN — NALOXONE HYDROCHLORIDE 0.1 MG: 0.4 INJECTION, SOLUTION INTRAMUSCULAR; INTRAVENOUS; SUBCUTANEOUS at 18:16

## 2021-12-09 RX ADMIN — PROPOFOL 100 MG: 10 INJECTION, EMULSION INTRAVENOUS at 15:45

## 2021-12-09 RX ADMIN — NEOSTIGMINE 3 MG: 1 INJECTION INTRAVENOUS at 16:11

## 2021-12-09 RX ADMIN — OXYCODONE HYDROCHLORIDE AND ACETAMINOPHEN 1 TABLET: 5; 325 TABLET ORAL at 17:30

## 2021-12-09 RX ADMIN — HYDROMORPHONE HYDROCHLORIDE 0.5 MG: 2 INJECTION, SOLUTION INTRAMUSCULAR; INTRAVENOUS; SUBCUTANEOUS at 16:21

## 2021-12-09 RX ADMIN — MIDAZOLAM 2 MG: 1 INJECTION INTRAMUSCULAR; INTRAVENOUS at 15:37

## 2021-12-09 RX ADMIN — FAMOTIDINE 20 MG: 10 INJECTION INTRAVENOUS at 15:37

## 2021-12-09 RX ADMIN — AMPICILLIN SODIUM AND SULBACTAM SODIUM 3 G: 2; 1 INJECTION, POWDER, FOR SOLUTION INTRAMUSCULAR; INTRAVENOUS at 15:37

## 2021-12-09 RX ADMIN — KETOROLAC TROMETHAMINE 30 MG: 30 INJECTION, SOLUTION INTRAMUSCULAR; INTRAVENOUS at 17:28

## 2021-12-09 RX ADMIN — SODIUM CHLORIDE, POTASSIUM CHLORIDE, SODIUM LACTATE AND CALCIUM CHLORIDE: 600; 310; 30; 20 INJECTION, SOLUTION INTRAVENOUS at 15:37

## 2021-12-09 RX ADMIN — DEXAMETHASONE SODIUM PHOSPHATE 8 MG: 4 INJECTION, SOLUTION INTRA-ARTICULAR; INTRALESIONAL; INTRAMUSCULAR; INTRAVENOUS; SOFT TISSUE at 16:01

## 2021-12-09 RX ADMIN — ROCURONIUM BROMIDE 30 MG: 10 SOLUTION INTRAVENOUS at 15:45

## 2021-12-09 RX ADMIN — LIDOCAINE HYDROCHLORIDE 60 MG: 20 INJECTION, SOLUTION INFILTRATION; PERINEURAL at 15:45

## 2021-12-09 NOTE — ANESTHESIA POSTPROCEDURE EVALUATION
Patient: Rosemarie Wright    Procedure Summary     Date: 12/09/21 Room / Location:  [facility] OR 04 /  [facility]    Anesthesia Start: 1537 Anesthesia Stop: 1630    Procedure: CHOLECYSTECTOMY LAPAROSCOPIC WITH DAVINCI ROBOT (N/A Abdomen) Diagnosis:       Biliary dyskinesia      (Biliary dyskinesia [K82.8])    Surgeons: Mateo Wong MD Provider: Palomo Jefferson MD    Anesthesia Type: general ASA Status: 2          Anesthesia Type: general    Vitals  Vitals Value Taken Time   /87 12/09/21 1711   Temp 98 °F (36.7 °C) 12/09/21 1630   Pulse 73 12/09/21 1711   Resp 12 12/09/21 1711   SpO2 95 % 12/09/21 1711           Post Anesthesia Care and Evaluation    Patient location during evaluation: bedside  Patient participation: complete - patient participated  Level of consciousness: awake and alert  Pain score: 1  Pain management: adequate  Airway patency: patent  Anesthetic complications: No anesthetic complications  PONV Status: none  Cardiovascular status: acceptable  Respiratory status: acceptable  Hydration status: acceptable    Comments: Called to see patient after apneic spell in Phase 2 immediately after swallowing pain pill.    .      Upon arrival Sao2 was 98%  Pt aware of person place and time.    Narcan 0.1 mg iv given per my order. ABG drawn with ph 7.40/39/`133   CXR also done     Discussed with Dr Wong and will admit for observation. Pt has history of Sleep Apnea but doesn't use CPAP

## 2021-12-09 NOTE — OP NOTE
CHOLECYSTECTOMY LAPAROSCOPIC WITH DAVINCI ROBOT  Procedure Note    Rosemarie Wright  12/9/2021    Pre-op Diagnosis:   Biliary dyskinesia [K82.8]    Post-op Diagnosis:     Post-Op Diagnosis Codes:     * Biliary dyskinesia [K82.8]    Procedure(s):  CHOLECYSTECTOMY LAPAROSCOPIC WITH DAVINCI ROBOT    Surgeon(s):  Mateo Wong MD    Anesthesia: General    Staff:   Circulator: Junior Hobson RN  Scrub Person: Paola Billings  Assistant: Yang Giles    Findings: distended gallbladder, critical view of safety obtained    Operative Procedure: The patient was taken to the operating suite and placed supine on operating table.  Bilateral sequential compression devices were applied and general endotracheal anesthesia administered.  The abdomen was prepped and draped in usual sterile fashion.  Preoperative antibiotics were confirmed.  Timeout procedure was performed.  A Veress needle was inserted at Momin's point and saline drop test confirmed entry into the peritoneal space.  Pneumoperitoneum was established.  An 8 mm Optiview trocar was inserted through an infraumbilical incision.  The laparoscope was inserted and the Veress needle site was free of injury.  The Veress needle site was removed and upsized to an 8 mm robotic trocar.  A second 8 mm robotic trocar was placed in the anterior axillary line at the level of the umbilicus of the right abdomen.  A 5 mm Optiview trocar was then placed as far lateral as possible in the right abdomen for an assistant trocar.  At this time the robot was docked to the trocars and the robotic camera inserted.  The patient had been placed in reverse Trendelenburg with left lateral tilt prior to docking.  I then exited the sterile field and entered the robotic console.  My assistant placed a robotic hook in the right arm #1 and a fenestrated bipolar in the left arm #2.  My assistant grasped the fundus of the gallbladder and retracted anteriorly and superiorly. The  gallbladder was distended.  Mild adhesions to the fundus and infundibulum were taken down with cautery hook.  The infundibulum was exposed and grasped and retracted laterally and inferiorly.  The peritoneum on the anterior posterior surface of the gallbladder was opened with the cautery hook.  The gallbladder was elevated from the gallbladder fossa for a portion and the cystic duct and artery were identified and dissected free circumferentially.  All adventitial tissue between the cystic duct and artery was dissected free with the cautery hook.  The cystic plate was visible from the anterior and posterior views with only the cystic duct and artery seen entering the gallbladder.  With the critical view of safety obtained Firefly confirmed no abnormal ductal abnormality and at this time the cystic artery was controlled with a single Hemoclip placed proximally on the artery and the cystic duct controlled with 2 hemoclips placed on the cystic duct stump side and one on the infundibular side of the duct. The artery was divided with the cautery hook with no evidence of bleeding.  The cystic duct was divided with the cut mode of the cautery hook with no evidence of cystic duct stump leak.  The gallbladder was then removed from the gallbladder fossa intact.  This was done with the cautery hook.  Firefly was used to confirm no evidence of duct of Luschka or accessory duct leakage.  There was no cystic duct stump leakage.  At this time robotic instruments were removed under direct visualization.  The robot was undocked and I entered the sterile field for completion of the case.  A 5 mm laparoscope was inserted through a robotic trocar and the gallbladder was placed in a 5 mm Endo Catch bag. It was removed through the infraumbilical fascial defect.  The gallbladder fossa was hemostatic and at this time all trocars were removed under direct visualization and pneumoperitoneum was evacuated.  The infraumbilical fascial defect was  closed with a figure-of-eight Vicryl suture and all skin incisions closed with Monocryl subcuticular suture and dressed with Skin Affix.  The patient tolerated the procedure well.    Estimated Blood Loss: 10mL    Specimens:   Gallbladder           Drains: none    Grafts/Implants:  none    Complications: none      Mateo Wong MD     Date: 12/9/2021  Time: 16:20 EST

## 2021-12-09 NOTE — ANESTHESIA PROCEDURE NOTES
Airway  Urgency: elective    Date/Time: 12/9/2021 3:45 PM  Airway not difficult    General Information and Staff    Patient location during procedure: OR  Anesthesiologist: Palomo Jefferson MD  CRNA: Josephine Strickland CRNA    Indications and Patient Condition  Indications for airway management: airway protection    Preoxygenated: yes  MILS maintained throughout  Mask difficulty assessment: 2 - vent by mask + OA or adjuvant +/- NMBA    Final Airway Details  Final airway type: endotracheal airway      Successful airway: ETT  Cuffed: yes   Successful intubation technique: direct laryngoscopy and video laryngoscopy  Facilitating devices/methods: intubating stylet  Endotracheal tube insertion site: oral  Blade: Mau  Blade size: 3  ETT size (mm): 7.0  Cormack-Lehane Classification: grade IIa - partial view of glottis  Placement verified by: chest auscultation, capnometry and palpation of cuff   Cuff volume (mL): 6  Measured from: lips  ETT/EBT  to lips (cm): 22  Number of attempts at approach: 1  Assessment: lips, teeth, and gum same as pre-op and atraumatic intubation    Additional Comments  DL x 1 with mac 3, unable to visualize, glidescope obtained.  Mask ventilation.  Grade 2 view with glidescope.  Dentition as preop. No complications noted. Patient tolerated well.  ETT secured.

## 2021-12-09 NOTE — ANESTHESIA PREPROCEDURE EVALUATION
Anesthesia Evaluation     no history of anesthetic complications:  NPO Solid Status: > 8 hours  NPO Liquid Status: > 8 hours           Airway   Mallampati: II  TM distance: >3 FB  Neck ROM: full  No difficulty expected  Dental - normal exam     Pulmonary - normal exam   Cardiovascular - normal exam    (+) hypertension, hyperlipidemia,       Neuro/Psych  (+) headaches, psychiatric history,     GI/Hepatic/Renal/Endo    (+)  GERD,      Musculoskeletal     Abdominal  - normal exam   Substance History      OB/GYN          Other                      Anesthesia Plan    ASA 2     general with block     intravenous induction     Anesthetic plan, all risks, benefits, and alternatives have been provided, discussed and informed consent has been obtained with: patient.

## 2021-12-09 NOTE — ANESTHESIA PROCEDURE NOTES
"Peripheral Block      Patient reassessed immediately prior to procedure    Patient location during procedure: OR  Start time: 12/9/2021 3:49 PM  Stop time: 12/9/2021 3:52 PM  Reason for block: at surgeon's request and post-op pain management  Performed by  Anesthesiologist: Palomo Jefferson MD  CRNA: Josephine Strickland CRNA  Preanesthetic Checklist  Completed: patient identified, IV checked, site marked, risks and benefits discussed, surgical consent, monitors and equipment checked, pre-op evaluation and timeout performed  Prep:  Pt Position: supine  Sterile barriers:cap, gloves, sterile barriers and mask  Prep: ChloraPrep  Patient monitoring: blood pressure monitoring, continuous pulse oximetry and EKG  Procedure    Sedation: yes (Sedation, GA, Spinal,Epidural )  Performed under: general  Guidance:ultrasound guided    ULTRASOUND INTERPRETATION. Using ultrasound guidance a 20 G (20g 4\" Stimuplex) gauge needle was placed in close proximity to the nerve, at which point, under ultrasound guidance anesthetic was injected in the area of the nerve and spread of the anesthesia was seen on ultrasound in close proximity thereto.  There were no abnormalities seen on ultrasound; a digital image was taken; and the patient tolerated the procedure with no complications. Images:still images obtained    Laterality:Bilateral  Block Type:TAP  Injection Technique:single-shot  Needle Type:short-bevel  Needle Gauge:20 G  Resistance on Injection: none    Medications Used: buprenorphine (BUPRENEX) injection, 0.3 mg  dexamethasone (DECADRON) injection, 8 mg  ropivacaine (NAROPIN) injection 0.5 %, 250 mg      Medications  Preservative Free Saline:10ml  Comment:Block Injection:  Total volume divided equally between all 4 injection sites      Post Assessment  Injection Assessment: negative aspiration for heme, incremental injection and no paresthesia on injection  Patient Tolerance:comfortable throughout block  Complications:no  Additional " Notes  The pt was in the supine position under general anesthesia    Under Ultrasound guidance, a BBraun 4inch 360 degree needle was advanced with Normal Saline hydro dissection of tissue.  The Internal Oblique and Transversus Abdominus muscles where visualized.  At or before the aponeurosis of Internal Oblique, local anesthetic spread was visualized in the Transversus Abdominus Plane. Injection was made incrementally with aspiration every 5 mls.  There was no  intravascular injection,  injection pressure was normal, there was no neural injection, and the procedure was completed without difficulty. The same procedure was completed for left and right sided lateral tap blocks.    Under Ultrasound guidance, a Oliver 4inch 360 degree needle was advanced with Normal Saline hydro dissection of tissue.  The Rectus and Transversus Abdominus muscles where visualized.  The needle tip was placed between the Transversus Abdominus and rectus abdominus, local anesthetic spread was visualized in the Transversus Abdominus Plane. Injection was made incrementally with aspiration every 5 mls.  There was no  intravascular injection,  injection pressure was normal, there was no neural injection, and the procedure was completed without difficulty. The same procedure was completed for left and right sided subcostal tap blocks. Thank You.

## 2021-12-09 NOTE — NURSING NOTE
PT started desaturating around 1800 and HR began dropping, I applied a nonrebreather mask and turned up her oxygen. I called for Anesthesia. Pt's sats starting coming up and Dr. Jefferson was called and he ordered .1 of narcan.He also ordered an ABG and chest xray. Pt was given the narcan. Pt had trouble waking up well from the time I had her in SCOT until her narcan administration. After the narcan pt was a lot more alert and vitals remained stable.

## 2021-12-09 NOTE — ANESTHESIA POSTPROCEDURE EVALUATION
Patient: Rosemarie Wright    Procedure Summary     Date: 12/09/21 Room / Location: [facility] OR  /  [facility] OR    Anesthesia Start: 1537 Anesthesia Stop: 1630    Procedure: CHOLECYSTECTOMY LAPAROSCOPIC WITH DAVINCI ROBOT (N/A Abdomen) Diagnosis:       Biliary dyskinesia      (Biliary dyskinesia [K82.8])    Surgeons: Mateo Wong MD Provider: Palomo Jefferson MD    Anesthesia Type: general ASA Status: 2          Anesthesia Type: general    Vitals  Vitals Value Taken Time   /85 12/09/21 1646   Temp 98 °F (36.7 °C) 12/09/21 1630   Pulse 69 12/09/21 1646   Resp 12 12/09/21 1646   SpO2 98 % 12/09/21 1646           Post Anesthesia Care and Evaluation    Patient location during evaluation: PHASE II  Patient participation: complete - patient participated  Level of consciousness: awake and alert  Pain score: 0  Pain management: adequate  Airway patency: patent  Anesthetic complications: No anesthetic complications    Cardiovascular status: acceptable  Respiratory status: acceptable  Hydration status: acceptable

## 2021-12-10 VITALS
WEIGHT: 191.8 LBS | SYSTOLIC BLOOD PRESSURE: 114 MMHG | OXYGEN SATURATION: 96 % | DIASTOLIC BLOOD PRESSURE: 67 MMHG | TEMPERATURE: 98.5 F | BODY MASS INDEX: 32.74 KG/M2 | HEART RATE: 92 BPM | HEIGHT: 64 IN | RESPIRATION RATE: 18 BRPM

## 2021-12-10 PROCEDURE — 99024 POSTOP FOLLOW-UP VISIT: CPT | Performed by: SURGERY

## 2021-12-10 PROCEDURE — G0378 HOSPITAL OBSERVATION PER HR: HCPCS

## 2021-12-10 RX ADMIN — FAMOTIDINE 10 MG: 20 TABLET, FILM COATED ORAL at 08:42

## 2021-12-10 RX ADMIN — PROPRANOLOL HYDROCHLORIDE 20 MG: 20 TABLET ORAL at 08:42

## 2021-12-10 RX ADMIN — LOSARTAN POTASSIUM: 50 TABLET, FILM COATED ORAL at 08:42

## 2021-12-10 NOTE — CASE MANAGEMENT/SOCIAL WORK
Case Management Discharge Note      Final Note: Patient being discharged home on this date 12/10/21.  No needs identified.         Selected Continued Care - Admitted Since 12/9/2021    Final Discharge Disposition Code: 01 - home or self-care

## 2021-12-10 NOTE — PLAN OF CARE
Goal Outcome Evaluation:              Outcome Summary: Patient Alert with no complaints or request, currently on room air 02 Sat >88%, VSS, Will continue to monitor.

## 2021-12-10 NOTE — PROGRESS NOTES
Chief complaint: Apnea after anesthesia for cholecystectomy    No acute events overnight.  Patient awake alert this morning doing well.    Afebrile, vital sign stable  Clear to auscultation bilaterally  Abdomen soft, appropriately tender, incisions clean dry and intact    61-year-old female status post laparoscopic cholecystectomy.  Postoperatively after anesthesia she was drowsy and developed apnea with a home history of sleep apnea.  She was observed overnight and her oxygenation status is stable and she is alert this morning and ready for discharge home.  -Discharge home  -Follow-up in 2 weeks with Dr. Wong

## 2021-12-10 NOTE — PLAN OF CARE
Goal Outcome Evaluation:         Patient received from surgery to Room 350B at 1930; Dr. Wong wanted her to be in Observation overnight. She is resting comfortably; pulse ox in place at 98%. Will continue to monitor.

## 2021-12-10 NOTE — NURSING NOTE
wanted pt to be admitted since she could not keep her O2 sats up without oxygen when she would drift to sleep. Pt remained very drowsy in SCOT. Dr. Wong put in orders for admission for observation.

## 2021-12-10 NOTE — DISCHARGE SUMMARY
Date of Discharge:  12/10/2021    Discharge Diagnosis: Biliary dyskinesia    Presenting Problem/History of Present Illness  Active Hospital Problems    Diagnosis  POA   • **Biliary dyskinesia [K82.8]  Unknown      Resolved Hospital Problems   No resolved problems to display.          Hospital Course  Patient is a 61 y.o. female presented with biliary dyskinesia.  Postoperatively she was very drowsy and with her history of sleep apnea was admitted for overnight observation.  She did well postoperatively and oxygenation was maintained on room air.  She is alert and oriented the following morning tolerating diet and ready for discharge home.    Procedures Performed    Procedure(s):  CHOLECYSTECTOMY LAPAROSCOPIC WITH DAVINCI ROBOT  -------------------       Consults:   Consults     No orders found for last 30 day(s).            Vital Signs  Temp:  [97.2 °F (36.2 °C)-98.7 °F (37.1 °C)] 98.5 °F (36.9 °C)  Heart Rate:  [46-92] 92  Resp:  [12-20] 18  BP: (101-167)/(43-93) 114/67      Discharge Disposition  Home or Self Care    Discharge Medications     Discharge Medications      New Medications      Instructions Start Date   acetaminophen 325 MG tablet  Commonly known as: TYLENOL   650 mg, Oral, Every 4 Hours PRN      ibuprofen 600 MG tablet  Commonly known as: ADVIL,MOTRIN   600 mg, Oral, Every 6 Hours PRN      traMADol 50 MG tablet  Commonly known as: ULTRAM   50 mg, Oral, Every 8 Hours PRN         Continue These Medications      Instructions Start Date   cyclobenzaprine 5 MG tablet  Commonly known as: FLEXERIL   5 mg, Oral, 3 Times Daily PRN      famotidine 10 MG tablet  Commonly known as: PEPCID   10 mg, Oral, 2 Times Daily      losartan-hydrochlorothiazide 50-12.5 MG per tablet  Commonly known as: HYZAAR   1 tablet, Oral, Daily      montelukast 10 MG tablet  Commonly known as: SINGULAIR   10 mg, Oral, Nightly      propranolol 20 MG tablet  Commonly known as: INDERAL   20 mg, Oral, 3 Times Daily      simvastatin 20 MG  tablet  Commonly known as: ZOCOR   20 mg, Oral, Nightly      SUMAtriptan 50 MG tablet  Commonly known as: IMITREX   50 mg, Oral, Every 2 Hours PRN, Take one tablet at onset of headache. May repeat dose one time in 2 hours if headache not relieved.       Trintellix 10 MG tablet  Generic drug: Vortioxetine HBr   1 tablet, Oral, Daily             Discharge Diet:   Diet Instructions     Advance Diet as Tolerated            Activity at Discharge:   Activity Instructions     Discharge activity      No lifting over 10 lbs for 4 weeks  No driving while on narcotics            Follow-up Appointments  Future Appointments   Date Time Provider Department Center   12/22/2021  9:40 AM Mateo Wong MD [facility]     Additional Instructions for the Follow-ups that You Need to Schedule     Discharge Follow-up with Specialty: 2 Weeks   As directed      Follow Up: 2 Weeks         Discharge Follow-up with Specialty: 2 Weeks   As directed      Follow Up: 2 Weeks               Test Results Pending at Discharge  Pending Labs     Order Current Status    Tissue Pathology Exam In process           Mateo Wong MD  12/10/21  12:20 EST    Time: Discharge 15 min

## 2021-12-10 NOTE — ADDENDUM NOTE
Addendum  created 12/09/21 1904 by Palomo Jefferson MD    Delete clinical note, Pend clinical note

## 2021-12-13 ENCOUNTER — TELEPHONE (OUTPATIENT)
Dept: SURGERY | Facility: CLINIC | Age: 61
End: 2021-12-13

## 2021-12-13 ENCOUNTER — HOSPITAL ENCOUNTER (OUTPATIENT)
Dept: ULTRASOUND IMAGING | Facility: HOSPITAL | Age: 61
Discharge: HOME | End: 2021-12-13
Admitting: FAMILY MEDICINE

## 2021-12-13 ENCOUNTER — TRANSCRIBE ORDERS (OUTPATIENT)
Dept: LAB | Facility: HOSPITAL | Age: 61
End: 2021-12-13

## 2021-12-13 DIAGNOSIS — R79.89 SERUM CREATININE RAISED: Primary | ICD-10-CM

## 2021-12-13 DIAGNOSIS — R79.89 SERUM CREATININE RAISED: ICD-10-CM

## 2021-12-13 PROCEDURE — 76775 US EXAM ABDO BACK WALL LIM: CPT

## 2021-12-13 PROCEDURE — 76775 US EXAM ABDO BACK WALL LIM: CPT | Performed by: RADIOLOGY

## 2021-12-13 NOTE — TELEPHONE ENCOUNTER
Caller: BOOKER MERRITT, PATIENT    Relationship: SELF    Best call back number: [phone]    What form or medical record are you requesting: RECORDS OF SURGERY WITH DR. DE LA O FROM 12/9/21 AND PLEASE INCLUDE NOTES FROM ANAESTHESIOLOGIST     Who is requesting this form or medical record from you: PCP DR. ADRIANA MANJARREZ    How would you like to receive the form or medical records (pick-up, mail, fax): FAX  If fax, what is the fax number: [phone]    Timeframe paperwork needed: AT EARLIEST CONVENIENCE

## 2021-12-15 ENCOUNTER — LAB (OUTPATIENT)
Dept: LAB | Facility: HOSPITAL | Age: 61
End: 2021-12-15

## 2021-12-15 DIAGNOSIS — R79.89 SERUM CREATININE RAISED: ICD-10-CM

## 2021-12-15 LAB
COLLECT DURATION TIME UR: 24 HRS
CREAT UR-MCNC: 62.9 MG/DL
CREATINE 24H UR-MRATE: 0.44 G/24 HR (ref 0.7–1.6)
LAB AP CASE REPORT: NORMAL
PATH REPORT.FINAL DX SPEC: NORMAL
SPECIMEN VOL 24H UR: 700 ML

## 2021-12-15 PROCEDURE — 82570 ASSAY OF URINE CREATININE: CPT

## 2021-12-15 PROCEDURE — 81050 URINALYSIS VOLUME MEASURE: CPT

## 2021-12-22 ENCOUNTER — OFFICE VISIT (OUTPATIENT)
Dept: SURGERY | Facility: CLINIC | Age: 61
End: 2021-12-22

## 2021-12-22 VITALS — HEIGHT: 64 IN | WEIGHT: 191 LBS | BODY MASS INDEX: 32.61 KG/M2

## 2021-12-22 DIAGNOSIS — K82.8 BILIARY DYSKINESIA: Primary | ICD-10-CM

## 2021-12-22 PROCEDURE — 99024 POSTOP FOLLOW-UP VISIT: CPT | Performed by: SURGERY

## 2021-12-22 NOTE — PROGRESS NOTES
Subjective   Rosemarie Wright is a 61 y.o. female  is here today for follow-up.         Rosemarie Wright is a 61 y.o. female here for followup after cholecystectomy.  The patient is doing well and tolerating diet.  Their incisions are healing well.  No complaints reported.    Pathology consistent with               Physical Exam:  NAD, AOx3  RRR  CTAB  S/appropriately tender/incisions healing well          Diagnoses and all orders for this visit:    1. Biliary dyskinesia (Primary)        Assessment     61 y.o. female s/p cholecystectomy secondary to chronic cholecystitis.  The patient is doing well and will follow-up as needed.

## 2023-01-13 ENCOUNTER — TELEPHONE (OUTPATIENT)
Dept: PSYCHIATRY | Facility: CLINIC | Age: 63
End: 2023-01-13
Payer: COMMERCIAL

## 2023-03-14 ENCOUNTER — OFFICE VISIT (OUTPATIENT)
Dept: INTERNAL MEDICINE | Facility: CLINIC | Age: 63
End: 2023-03-14
Payer: COMMERCIAL

## 2023-03-14 VITALS
WEIGHT: 191 LBS | TEMPERATURE: 98 F | OXYGEN SATURATION: 98 % | HEART RATE: 78 BPM | BODY MASS INDEX: 32.61 KG/M2 | DIASTOLIC BLOOD PRESSURE: 82 MMHG | HEIGHT: 64 IN | SYSTOLIC BLOOD PRESSURE: 122 MMHG

## 2023-03-14 DIAGNOSIS — K21.9 GASTROESOPHAGEAL REFLUX DISEASE, UNSPECIFIED WHETHER ESOPHAGITIS PRESENT: ICD-10-CM

## 2023-03-14 DIAGNOSIS — G43.009 MIGRAINE WITHOUT AURA AND WITHOUT STATUS MIGRAINOSUS, NOT INTRACTABLE: ICD-10-CM

## 2023-03-14 DIAGNOSIS — Z12.31 SCREENING MAMMOGRAM FOR BREAST CANCER: ICD-10-CM

## 2023-03-14 DIAGNOSIS — Z23 NEED FOR TDAP VACCINATION: ICD-10-CM

## 2023-03-14 DIAGNOSIS — I10 PRIMARY HYPERTENSION: ICD-10-CM

## 2023-03-14 DIAGNOSIS — E78.5 HYPERLIPIDEMIA, UNSPECIFIED HYPERLIPIDEMIA TYPE: Primary | ICD-10-CM

## 2023-03-14 DIAGNOSIS — F32.A ANXIETY AND DEPRESSION: ICD-10-CM

## 2023-03-14 DIAGNOSIS — F41.9 ANXIETY AND DEPRESSION: ICD-10-CM

## 2023-03-14 PROBLEM — K82.8 BILIARY DYSKINESIA: Status: RESOLVED | Noted: 2021-11-16 | Resolved: 2023-03-14

## 2023-03-14 RX ORDER — VORTIOXETINE 20 MG/1
TABLET, FILM COATED ORAL
COMMUNITY
Start: 2023-01-31 | End: 2023-03-14

## 2023-03-14 RX ORDER — TRETINOIN
POWDER (GRAM) MISCELLANEOUS
COMMUNITY
Start: 2023-01-10

## 2023-03-14 RX ORDER — DESVENLAFAXINE SUCCINATE 50 MG/1
50 TABLET, EXTENDED RELEASE ORAL DAILY
Qty: 30 TABLET | Refills: 2 | Status: SHIPPED | OUTPATIENT
Start: 2023-03-14

## 2023-03-14 RX ORDER — LANCETS 33 GAUGE
EACH MISCELLANEOUS
COMMUNITY
Start: 2023-02-10

## 2023-03-14 RX ORDER — ROSUVASTATIN CALCIUM 40 MG/1
40 TABLET, COATED ORAL NIGHTLY
COMMUNITY
Start: 2023-01-12

## 2023-03-14 RX ORDER — BLOOD SUGAR DIAGNOSTIC
STRIP MISCELLANEOUS
COMMUNITY
Start: 2023-02-10

## 2023-03-14 RX ORDER — OMEPRAZOLE 20 MG/1
CAPSULE, DELAYED RELEASE ORAL
COMMUNITY
Start: 2023-01-10

## 2023-03-14 RX ORDER — BLOOD-GLUCOSE METER
KIT MISCELLANEOUS
COMMUNITY
Start: 2023-02-10

## 2023-03-14 NOTE — ASSESSMENT & PLAN NOTE
Uncontrolled on current medication. We will wean off Trintellix. Patient will take 10 mg daily for the next week, then 5 mg daily for a week and then stop the medication. We will start on Pristiq thereafter. She has been encouraged to schedule an appointment with psychiatry as well on checkout.

## 2023-03-14 NOTE — ASSESSMENT & PLAN NOTE
Improved with current medications. She will continue Prilosec. Advised that her upper abdominal pain is likely secondary to gas pains, encouraged to take Gas-X when this occurs. Follow a healthy diet.

## 2023-03-14 NOTE — ASSESSMENT & PLAN NOTE
Hypertension is Controlled on current medications.  She will continue losartan-hydrochlorothiazide.

## 2023-03-14 NOTE — ASSESSMENT & PLAN NOTE
Uncontrolled per patient's reported last labs. She will continue Crestor at this time. She has only been on the medication about a month. We will obtain labs from previous provider.

## 2023-03-14 NOTE — PROGRESS NOTES
Rosemarie Wright is a 63 y.o. female.    Chief Complaint   Patient presents with   • Heartburn   • Hypertension   • Hyperlipidemia       HPI     Rosemarie Wright is a 62 year old female who presents today to [facility] care. She does have a history of hypertension, hyperlipidemia, and GERD.    Patient has had hyperlipidemia for few years. She has not been compliant with low fat diet. She has been compliant with taking the medications, without side effects.  She is not active, and never exercises.    Patient has hypertension.  They are taking hydrochlorothiazide.  They have been compliant with medications.  The patient denies any side effects to the medication.  Blood pressure is controlled in the office today.  They are following a low salt diet.  They are not active.     The patient is taking omeprazole for reflux and heartburn.The patient feels like the omeprazole is helping with her reflux. As long as she takes her medication she does not have much reflux or heartburn.     She states she get a migraine maybe once or twice a month. Her Imitrex medication works pretty good for her. The patient denies seeing spots or squiggles beforehand however; she can feel it coming on. She has tension and a chiropractor adjustment occasionally helps.     The patient has had a colonoscopy previously by Dr. Wright in [place]. She denies having any polyps. The patient will be due for another in 10 years.     She denies having her mammograms done regularly. The patient states it has been over 3 years since her previous mammogram. She has not had a pap smear in 4 to 5 years. The patient does not have a gynecologist.     She has received her influenza vaccine, both shingles vaccines and two COVID-19 vaccines. The patient does need her booster vaccine. She will be eligible late April or early May because she had Covid on 02/14/2023, she tested positive. The patient has not had a tetanus vaccine in 15 years.     She last had blood  "work done on 02/18/2023 through [facility]. The patient cholesterol was not high at that point. She did have changes made with her Crestor. The patient has only been on her current dosage for one month.     The patient reports she has an appointment on 03/20/2023 for endocrinology. She has questions about prediabetes about her diet. The patient does not know what her A1c was when it was checked.     She does experience shortness of breath but believes it is due to her weight. The patient does occasionally have constipation and diarrhea but nothing continuous. She does have a lingering cough which she believes is from COVID-19. The patient does feel fatigue all the time. She is unsure if her thyroid was checked when she had blood work done. The patient has experienced changes in her vision she getting new glasses. She does have a cold intolerance. The patient does occasionally have a sharp pain in her abdominal area under her left breast. She does occasionally have abdominal tenderness, it could be associated with flatulence. The patient denies any numbness or weakness at all.    The patient does have concerns with her depression and taking medication for PTSD. She notes she has always been \"going and going\" with her daughter whom it now at [facility] however; she has noticed she just lays around and sleep all the time. The patient adds she just wants to go home and go to bed. She believes she needs a \"booster\". The patient had GeneSight test done a few years ago with Dr. Nielson at [facility] in [place]. Other than Trintellix the patient has tried Cymbalta, Zoloft, Prozac, Paxil. She is unsure if she has been on Celexa. The patient believes one of the medications made things worse that is when the provider did the GeneSight testing. She has been on Trintellix medication for 6 years but does not feel it is 100 percent beneficial to her. The patient feels like her moods are fluctuating. She denies her moods were " fluctuating before taking the Trintellix medication. The patient adds her  has diagnosed her with being bipolar. She does not have trouble sleeping as long as she takes two Tylenol PM without the medication her mind races all night long. The patient occasionally has flashbacks and nightmares. She has previously taken medication to help with sleeping but is unsure the name of the medication. Some of the medications that were prescribed just kept her awake.       The following portions of the patient's history were reviewed and updated as appropriate: allergies, current medications, past family history, past medical history, past social history, past surgical history and problem list.     Past Medical History:   Diagnosis Date   • Allergic 2/1999    Seasonal allergies   • Anxiety    • Cholelithiasis 12/2021    [facility]   • Depression 12/2003    Medication   • Diabetes mellitus (HCC) 02/2023    Prediabetes   • Elevated cholesterol    • GERD (gastroesophageal reflux disease)    • Hyperlipidemia    • Hypertension    • Migraine    • Obesity 2023   • Sleep apnea     no c-pap   • Visual impairment 1976    Medication       Past Surgical History:   Procedure Laterality Date   • CHOLECYSTECTOMY N/A 12/09/2021    Procedure: CHOLECYSTECTOMY LAPAROSCOPIC WITH DAVINCI ROBOT;  Surgeon: Mateo Wong MD;  Location: [facility];  Service: Robotics - DaVinci;  Laterality: N/A;   • COLONOSCOPY     • LAPAROSCOPIC TUBAL LIGATION W/ FILSHIE CLIPS     • NASAL SINUS SURGERY      deviated septum       Family History   Problem Relation Age of Onset   • Breast cancer Mother    • Arthritis Father    • Hyperlipidemia Father    • Heart disease Maternal Grandmother        Social History     Socioeconomic History   • Marital status:    Tobacco Use   • Smoking status: Never   • Smokeless tobacco: Never   • Tobacco comments:     Never smoked do not like the smell   Vaping Use   • Vaping Use:  Never used   Substance and Sexual Activity   • Alcohol use: Never   • Drug use: Never   • Sexual activity: Yes     Partners: Male     Birth control/protection: Implant       No Known Allergies      Current Outpatient Medications:   •  Blood Glucose Monitoring Suppl (ONE TOUCH ULTRA MINI) w/Device kit, USE TO CHECK BLOOD SUGAR LEVEL DAILY, Disp: , Rfl:   •  cyclobenzaprine (FLEXERIL) 5 MG tablet, Take 1 tablet by mouth 3 (Three) Times a Day As Needed for Muscle Spasms., Disp: , Rfl:   •  losartan-hydrochlorothiazide (HYZAAR) 50-12.5 MG per tablet, Take 1 tablet by mouth Daily., Disp: , Rfl:   •  montelukast (SINGULAIR) 10 MG tablet, Take 1 tablet by mouth Every Night., Disp: , Rfl:   •  omeprazole (priLOSEC) 20 MG capsule, TAKE ONE CAPSULE BY MOUTH DAILY FOR STOMACH ACID, Disp: , Rfl:   •  OneTouch Delica Lancets 33G misc, USE TO CHECK BLOOD SUGAR LEVEL DAILY, Disp: , Rfl:   •  OneTouch Ultra test strip, USE TO CHECK BLOOD SUGAR LEVEL DAILY, Disp: , Rfl:   •  propranolol (INDERAL) 20 MG tablet, Take 1 tablet by mouth 3 (Three) Times a Day., Disp: , Rfl:   •  rosuvastatin (CRESTOR) 40 MG tablet, Take 1 tablet by mouth Every Night., Disp: , Rfl:   •  SUMAtriptan (IMITREX) 50 MG tablet, Take 1 tablet by mouth Every 2 (Two) Hours As Needed for Migraine. Take one tablet at onset of headache. May repeat dose one time in 2 hours if headache not relieved., Disp: , Rfl:   •  Tretinoin (Retinoic Acid) powder, , Disp: , Rfl:   •  desvenlafaxine (Pristiq) 50 MG 24 hr tablet, Take 1 tablet by mouth Daily., Disp: 30 tablet, Rfl: 2  •  Vortioxetine HBr (Trintellix) 5 MG tablet tablet, Take 1 tablet by mouth Daily With Breakfast. For 7 days and then stop., Disp: 7 tablet, Rfl: 0    ROS    Review of Systems   Constitutional: Positive for fatigue. Negative for chills and fever.   HENT: Negative for congestion, postnasal drip and sore throat.    Eyes: Negative for blurred vision and visual disturbance.   Respiratory: Positive for cough  "and shortness of breath. Negative for wheezing.    Cardiovascular: Negative for chest pain and leg swelling.   Gastrointestinal: Positive for abdominal pain, constipation and diarrhea. Negative for nausea and vomiting.   Endocrine: Positive for cold intolerance. Negative for heat intolerance.   Genitourinary: Negative for dysuria and frequency.   Musculoskeletal: Negative for arthralgias and back pain.   Neurological: Positive for headache. Negative for weakness and numbness.   Psychiatric/Behavioral: Positive for depressed mood. The patient is not nervous/anxious.        Vitals:    03/14/23 0913   BP: 122/82   BP Location: Left arm   Patient Position: Sitting   Cuff Size: Adult   Pulse: 78   Temp: 98 °F (36.7 °C)   SpO2: 98%   Weight: 86.6 kg (191 lb)   Height: 162.6 cm (64.02\")   PainSc: 0-No pain     Body mass index is 32.76 kg/m².    Physical Exam     Physical Exam  Constitutional:       General: She is not in acute distress.     Appearance: Normal appearance. She is well-developed.   HENT:      Head: Normocephalic and atraumatic.      Right Ear: External ear normal.      Left Ear: External ear normal.   Eyes:      Extraocular Movements: Extraocular movements intact.      Conjunctiva/sclera: Conjunctivae normal.   Cardiovascular:      Rate and Rhythm: Normal rate and regular rhythm.      Heart sounds: No murmur heard.  Pulmonary:      Effort: Pulmonary effort is normal. No respiratory distress.      Breath sounds: Normal breath sounds. No wheezing.   Abdominal:      General: Bowel sounds are normal. There is no distension.      Palpations: Abdomen is soft.      Tenderness: There is no abdominal tenderness.   Musculoskeletal:      Cervical back: Neck supple.      Right lower leg: No edema.      Left lower leg: No edema.   Lymphadenopathy:      Cervical: No cervical adenopathy.   Skin:     General: Skin is warm and dry.   Neurological:      Mental Status: She is alert and oriented to person, place, and time.      " Cranial Nerves: No cranial nerve deficit.   Psychiatric:         Mood and Affect: Mood normal.         Behavior: Behavior normal.         Assessment/Plan    Diagnoses and all orders for this visit:    1. Hyperlipidemia, unspecified hyperlipidemia type (Primary)  Assessment & Plan:  Uncontrolled per patient's reported last labs. She will continue Crestor at this time. She has only been on the medication about a month. We will obtain labs from previous provider.        2. Primary hypertension  Assessment & Plan:  Hypertension is Controlled on current medications.  She will continue losartan-hydrochlorothiazide.            3. Migraine without aura and without status migrainosus, not intractable  Assessment & Plan:  Headaches are improving with treatment.  May continue Imitrex as needed.          4. Anxiety and depression  Assessment & Plan:  Uncontrolled on current medication. We will wean off Trintellix. Patient will take 10 mg daily for the next week, then 5 mg daily for a week and then stop the medication. We will start on Pristiq thereafter. She has been encouraged to schedule an appointment with psychiatry as well on checkout.          5. Gastroesophageal reflux disease, unspecified whether esophagitis present  Assessment & Plan:  Improved with current medications. She will continue Prilosec. Advised that her upper abdominal pain is likely secondary to gas pains, encouraged to take Gas-X when this occurs. Follow a healthy diet.        6. Screening mammogram for breast cancer  -     Mammo Screening Digital Tomosynthesis Bilateral With CAD    7. Need for Tdap vaccination    Other orders  -     Tdap Vaccine Greater Than or Equal To 8yo IM  -     Vortioxetine HBr (Trintellix) 5 MG tablet tablet; Take 1 tablet by mouth Daily With Breakfast. For 7 days and then stop.  Dispense: 7 tablet; Refill: 0  -     desvenlafaxine (Pristiq) 50 MG 24 hr tablet; Take 1 tablet by mouth Daily.  Dispense: 30 tablet; Refill: 2      New  Medications Ordered This Visit   Medications   • Vortioxetine HBr (Trintellix) 5 MG tablet tablet     Sig: Take 1 tablet by mouth Daily With Breakfast. For 7 days and then stop.     Dispense:  7 tablet     Refill:  0   • desvenlafaxine (Pristiq) 50 MG 24 hr tablet     Sig: Take 1 tablet by mouth Daily.     Dispense:  30 tablet     Refill:  2       Orders Placed This Encounter   Procedures   • Tdap Vaccine Greater Than or Equal To 8yo IM       Return in about 2 months (around 5/14/2023) for anxiety and depression, PTSD.      Transcribed from ambient dictation for Celi Ceballos DO by Nahun Rios.  03/14/23   12:39 EDT    Patient or patient representative verbalized consent to the visit recording.  I have personally performed the services described in this document as transcribed by the above individual, and it is both accurate and complete.  Celi Ceballos DO  3/26/2023  23:53 EDT

## 2023-03-28 ENCOUNTER — TELEPHONE (OUTPATIENT)
Dept: INTERNAL MEDICINE | Facility: CLINIC | Age: 63
End: 2023-03-28

## 2023-03-28 NOTE — TELEPHONE ENCOUNTER
Have you received the results of genetic testing from doctor in [place], [place]?  If so that will tell you what medication would work for her.  Please return call to patient.  If a PA is needed all you have to do is per xyz test and that will cover the reasoning to get it approved.

## 2023-03-28 NOTE — TELEPHONE ENCOUNTER
Patient is going to stick with her current medication for now. Is requesting the genetic test from previous provider be sent to us.

## 2023-03-28 NOTE — TELEPHONE ENCOUNTER
Left VM asking advising of provider recommendations,asked her to call back to let us know if she would like to try something different or bring it up with Digna.

## 2023-03-28 NOTE — TELEPHONE ENCOUNTER
Caller: Rosemarie Wright    Relationship to patient: Self    Best call back number: [phone]    PATIENT IS CALLING TO STATES THAT SHE HAS BEEN ON PRISTIQ ABOUT ONE WEEK AND IS WANTING TO KNOW IF A NORMAL SYMPTOM IS BREAKING DOWN CRYIING UNTIL THE SIX WEEKS THAT IT WILL TAKE TO GET FULLY IN HER SYSTEM. PLEASE ADVISE.

## 2023-03-28 NOTE — TELEPHONE ENCOUNTER
If she is having worsening crying spells since starting the medication, that is not normal and she should stop the medication.  Please ask if she would like to try anything different or wait until she sees Digna for behavioral health.

## 2023-03-29 ENCOUNTER — TELEPHONE (OUTPATIENT)
Dept: INTERNAL MEDICINE | Facility: CLINIC | Age: 63
End: 2023-03-29
Payer: COMMERCIAL

## 2023-03-29 NOTE — TELEPHONE ENCOUNTER
PHONE CALL FROM PATIENT.  HAVE WE RECEIVED GENETIC TESTING RESULTS?  IF SO, CAN DR CARRANZA PRESCRIBE SOMETHING FROM THAT INFORMATION?     PLEASE CALL [phone]

## 2023-03-30 NOTE — TELEPHONE ENCOUNTER
Tried to reach patient to let her know I need her to come in and sign a release for [facility] to be sent to Dr. Ceballos.

## 2023-03-31 ENCOUNTER — TELEPHONE (OUTPATIENT)
Dept: INTERNAL MEDICINE | Facility: CLINIC | Age: 63
End: 2023-03-31

## 2023-03-31 NOTE — TELEPHONE ENCOUNTER
Caller: Liz Wrightley COBY    Relationship: Self    Best call back number: [phone]    What medications are you currently taking:   Current Outpatient Medications on File Prior to Visit   Medication Sig Dispense Refill   • Blood Glucose Monitoring Suppl (ONE TOUCH ULTRA MINI) w/Device kit USE TO CHECK BLOOD SUGAR LEVEL DAILY     • cyclobenzaprine (FLEXERIL) 5 MG tablet Take 1 tablet by mouth 3 (Three) Times a Day As Needed for Muscle Spasms.     • desvenlafaxine (Pristiq) 50 MG 24 hr tablet Take 1 tablet by mouth Daily. 30 tablet 2   • losartan-hydrochlorothiazide (HYZAAR) 50-12.5 MG per tablet Take 1 tablet by mouth Daily.     • montelukast (SINGULAIR) 10 MG tablet Take 1 tablet by mouth Every Night.     • omeprazole (priLOSEC) 20 MG capsule TAKE ONE CAPSULE BY MOUTH DAILY FOR STOMACH ACID     • OneTouch Delica Lancets 33G misc USE TO CHECK BLOOD SUGAR LEVEL DAILY     • OneTouch Ultra test strip USE TO CHECK BLOOD SUGAR LEVEL DAILY     • propranolol (INDERAL) 20 MG tablet Take 1 tablet by mouth 3 (Three) Times a Day.     • rosuvastatin (CRESTOR) 40 MG tablet Take 1 tablet by mouth Every Night.     • SUMAtriptan (IMITREX) 50 MG tablet Take 1 tablet by mouth Every 2 (Two) Hours As Needed for Migraine. Take one tablet at onset of headache. May repeat dose one time in 2 hours if headache not relieved.     • Tretinoin (Retinoic Acid) powder      • Vortioxetine HBr (Trintellix) 5 MG tablet tablet Take 1 tablet by mouth Daily With Breakfast. For 7 days and then stop. 7 tablet 0     No current facility-administered medications on file prior to visit.        What are your concerns:       DID WE RECEIVE FAX WITH GENE TEST RESULTS FROM [facility] (PROVIDERS FOR  HEALTHY LIVING)?    NEEDS A MEDICATION BECAUSE PRISTIQ DID NOT WORK.  CAN DOCTOR PRESCRIBE SOMETHING FROM THE GENE TEST RESULTS THAT HER BODY CAN HANDLE?    [place] [address]  [phone] [facility] [phone] FX

## 2023-04-03 ENCOUNTER — TELEPHONE (OUTPATIENT)
Dept: INTERNAL MEDICINE | Facility: CLINIC | Age: 63
End: 2023-04-03
Payer: COMMERCIAL

## 2023-04-03 NOTE — TELEPHONE ENCOUNTER
Caller: Rosemarie Wright    Relationship: Self     [phone] (Mobile)      What is the best time to reach you: ANY     What was the call regarding: WESLY IS ASKING IF YOU CAN FAX THIS FORM -[phone]  AS SHE CAN'T GET OFF WORK.  IF NOT CAN SHE CAN SIGN A BLANK MEDICAL RECORDS FORM AT THE HOSPITAL AND GET THAT TO YOU?    Do you require a callback: YES    We do not have these results. We do not have a record release on file for you.     Please come in to sign a record release.      Thank you.

## 2023-04-19 ENCOUNTER — TELEPHONE (OUTPATIENT)
Dept: INTERNAL MEDICINE | Facility: CLINIC | Age: 63
End: 2023-04-19
Payer: COMMERCIAL

## 2023-04-19 NOTE — TELEPHONE ENCOUNTER
Please notify patient I have received previous [facility] results.  I had prescribed pristiq at her last visit.  This is on her good list of medication.  It does take about 4-6 weeks to take effect.  Hopefully she has started this.  She reported trouble sleeping as well.  Trazodone is also on her good list.  If she would like to try trazodone for sleep, I can send that to her pharmacy.

## 2023-04-19 NOTE — TELEPHONE ENCOUNTER
Patient notified and is afraid to use anything for sleep due to nightmares in the past with other medications. She will discuss with you on her next visit.

## 2023-06-02 RX ORDER — DESVENLAFAXINE SUCCINATE 50 MG/1
TABLET, EXTENDED RELEASE ORAL
Qty: 90 TABLET | Refills: 0 | Status: SHIPPED | OUTPATIENT
Start: 2023-06-02

## 2023-06-14 ENCOUNTER — OFFICE VISIT (OUTPATIENT)
Dept: INTERNAL MEDICINE | Facility: CLINIC | Age: 63
End: 2023-06-14
Payer: COMMERCIAL

## 2023-06-14 VITALS
BODY MASS INDEX: 33.12 KG/M2 | TEMPERATURE: 97 F | WEIGHT: 194 LBS | SYSTOLIC BLOOD PRESSURE: 122 MMHG | DIASTOLIC BLOOD PRESSURE: 82 MMHG | HEART RATE: 72 BPM | HEIGHT: 64 IN | OXYGEN SATURATION: 97 %

## 2023-06-14 DIAGNOSIS — F32.A ANXIETY AND DEPRESSION: ICD-10-CM

## 2023-06-14 DIAGNOSIS — I10 PRIMARY HYPERTENSION: ICD-10-CM

## 2023-06-14 DIAGNOSIS — K21.9 GASTROESOPHAGEAL REFLUX DISEASE, UNSPECIFIED WHETHER ESOPHAGITIS PRESENT: ICD-10-CM

## 2023-06-14 DIAGNOSIS — R10.9 CHRONIC ABDOMINAL PAIN: Primary | ICD-10-CM

## 2023-06-14 DIAGNOSIS — G89.29 CHRONIC ABDOMINAL PAIN: Primary | ICD-10-CM

## 2023-06-14 DIAGNOSIS — E78.5 HYPERLIPIDEMIA, UNSPECIFIED HYPERLIPIDEMIA TYPE: ICD-10-CM

## 2023-06-14 DIAGNOSIS — F41.9 ANXIETY AND DEPRESSION: ICD-10-CM

## 2023-06-14 DIAGNOSIS — R19.7 DIARRHEA, UNSPECIFIED TYPE: ICD-10-CM

## 2023-06-14 PROCEDURE — 99214 OFFICE O/P EST MOD 30 MIN: CPT | Performed by: FAMILY MEDICINE

## 2023-06-14 RX ORDER — OMEPRAZOLE 20 MG/1
20 CAPSULE, DELAYED RELEASE ORAL DAILY
Qty: 90 CAPSULE | Refills: 2 | Status: SHIPPED | OUTPATIENT
Start: 2023-06-14

## 2023-06-14 RX ORDER — CHOLESTYRAMINE 4 G/9G
1 POWDER, FOR SUSPENSION ORAL 2 TIMES DAILY PRN
Qty: 100 EACH | Refills: 1 | Status: SHIPPED | OUTPATIENT
Start: 2023-06-14

## 2023-06-14 NOTE — PROGRESS NOTES
"Rosemarie Wright is a 63 y.o. female.    Chief Complaint   Patient presents with    Abdominal Pain     Under ribs and bilateral, wraps around. 30 minutes after eating she gets nausea and vomits. Has been going on for a year. No gallbladder.     Diarrhea     Soft stool and runny for 5 out of 6.        HPI     Rosemarie Wright is a 63-year-old female who presents today complaining of abdominal pain and diarrhea.     Rosemarie reports experiencing left-sided abdominal pain radiating down into her back. When the pain first began it started only on the left side but is currently all across the upper abdomen. She did not experience this same level of pain before she had her colonoscopy three years ago. Nausea accompanies the pain and sometimes the pain \"stops her in her track\". Sometimes the abdominal pain can be all day and she may be relieved from it in a few hours. It is worse at night when she sleeps on her side. Omeprazole continues to be taken by the patient. She does not experience any trouble urinating or dysuria. Seven years ago, a CT scan of her abdomen on left was completed and was normal     Since her cholecystectomy 3 years ago the patient has experienced ongoing diarrhea. She is willing to try medication because when she goes out for dinner it must be \"cut short\" since she is \"on the lookout\" to use the restroom     Patient has hypertension. She has been compliant with taking medications without any side effects. Blood pressure is controlled in the office today.  She is following a low salt diet and is active     Patient has had hyperlipidemia for few years. She has been compliant with low fat diet. Is compliant with taking Crestor without side effects. A repeat of cholesterol levels will be obtained today.     No feelings of being nervous or worried often have occurred since she has been taking Pristiq. She states it has made a \"world of difference\" since she started it and discontinued Trintellix. No " feelings of being sad, down, hopeless, or worthless have occurred and she has not had any flashbacks or nightmares.     Three years ago, she had a colonoscopy completed by Dr. Wright and it was normal.      The following portions of the patient's history were reviewed and updated as appropriate: allergies, current medications, past family history, past medical history, past social history, past surgical history and problem list.     No Known Allergies      Current Outpatient Medications:     Blood Glucose Monitoring Suppl (ONE TOUCH ULTRA MINI) w/Device kit, USE TO CHECK BLOOD SUGAR LEVEL DAILY, Disp: , Rfl:     cyclobenzaprine (FLEXERIL) 5 MG tablet, Take 1 tablet by mouth 3 (Three) Times a Day As Needed for Muscle Spasms., Disp: , Rfl:     desvenlafaxine (PRISTIQ) 50 MG 24 hr tablet, TAKE ONE TABLET BY MOUTH DAILY FOR MOOD, Disp: 90 tablet, Rfl: 0    losartan-hydrochlorothiazide (HYZAAR) 50-12.5 MG per tablet, Take 1 tablet by mouth Daily., Disp: , Rfl:     montelukast (SINGULAIR) 10 MG tablet, Take 1 tablet by mouth Every Night., Disp: , Rfl:     omeprazole (priLOSEC) 20 MG capsule, Take 1 capsule by mouth Daily., Disp: 90 capsule, Rfl: 2    OneTouch Delica Lancets 33G misc, USE TO CHECK BLOOD SUGAR LEVEL DAILY, Disp: , Rfl:     OneTouch Ultra test strip, USE TO CHECK BLOOD SUGAR LEVEL DAILY, Disp: , Rfl:     propranolol (INDERAL) 20 MG tablet, Take 1 tablet by mouth 3 (Three) Times a Day., Disp: , Rfl:     rosuvastatin (CRESTOR) 40 MG tablet, Take 1 tablet by mouth Every Night., Disp: , Rfl:     SUMAtriptan (IMITREX) 50 MG tablet, Take 1 tablet by mouth Every 2 (Two) Hours As Needed for Migraine. Take one tablet at onset of headache. May repeat dose one time in 2 hours if headache not relieved., Disp: , Rfl:     Tretinoin (Retinoic Acid) powder, , Disp: , Rfl:     cholestyramine (Questran) 4 g packet, Take 1 packet by mouth 2 (Two) Times a Day As Needed (diarrhea)., Disp: 100 each, Rfl: 1    ROS    Review of  "Systems   Respiratory:  Negative for shortness of breath.    Cardiovascular:  Negative for chest pain.   Gastrointestinal:  Positive for abdominal pain, diarrhea and nausea.   Psychiatric/Behavioral:  Negative for depressed mood. The patient is not nervous/anxious.      Vitals:    06/14/23 0828   BP: 122/82   BP Location: Left arm   Patient Position: Sitting   Cuff Size: Adult   Pulse: 72   Temp: 97 °F (36.1 °C)   SpO2: 97%   Weight: 88 kg (194 lb)   Height: 162.6 cm (64.02\")   PainSc:   4     Body mass index is 33.28 kg/m².    Physical Exam     Physical Exam  Constitutional:       General: She is not in acute distress.     Appearance: Normal appearance. She is well-developed.   HENT:      Head: Normocephalic and atraumatic.      Right Ear: External ear normal.      Left Ear: External ear normal.   Eyes:      Extraocular Movements: Extraocular movements intact.      Conjunctiva/sclera: Conjunctivae normal.   Cardiovascular:      Rate and Rhythm: Normal rate and regular rhythm.      Heart sounds: No murmur heard.  Pulmonary:      Effort: Pulmonary effort is normal. No respiratory distress.      Breath sounds: Normal breath sounds. No wheezing.   Abdominal:      General: Bowel sounds are normal. There is no distension.      Palpations: Abdomen is soft.      Tenderness: There is abdominal tenderness in the right upper quadrant, right lower quadrant and left upper quadrant.   Musculoskeletal:      Right lower leg: No edema.      Left lower leg: No edema.   Skin:     General: Skin is warm and dry.   Neurological:      Mental Status: She is alert and oriented to person, place, and time.      Cranial Nerves: No cranial nerve deficit.   Psychiatric:         Mood and Affect: Mood normal.         Behavior: Behavior normal.       Assessment/Plan    Diagnoses and all orders for this visit:    1. Chronic abdominal pain (Primary)  Assessment & Plan:  Patient continues to take Prilosec for GERD. We will refer to Dr. Wright with " gastroenterology for further evaluation. She was previously seen by Dr. Wright for colonoscopy about 3 years ago.    Orders:  -     Ambulatory Referral to Gastroenterology    2. Hyperlipidemia, unspecified hyperlipidemia type  Assessment & Plan:  Uncontrolled and patient will continue Crestor. We will obtain an updated lipid panel today.     Orders:  -     Comprehensive Metabolic Panel  -     Lipid Panel    3. Primary hypertension  -     CBC & Differential  -     Comprehensive Metabolic Panel    4. Anxiety and depression  Assessment & Plan:  Improved with current medications. She will continue Pristiq.      5. Gastroesophageal reflux disease, unspecified whether esophagitis present  -     Ambulatory Referral to Gastroenterology    6. Diarrhea, unspecified type  Assessment & Plan:  Likely secondary to cholecystectomy. She may try Questran as needed.    Orders:  -     Ambulatory Referral to Gastroenterology    Other orders  -     omeprazole (priLOSEC) 20 MG capsule; Take 1 capsule by mouth Daily.  Dispense: 90 capsule; Refill: 2  -     cholestyramine (Questran) 4 g packet; Take 1 packet by mouth 2 (Two) Times a Day As Needed (diarrhea).  Dispense: 100 each; Refill: 1        New Medications Ordered This Visit   Medications    omeprazole (priLOSEC) 20 MG capsule     Sig: Take 1 capsule by mouth Daily.     Dispense:  90 capsule     Refill:  2    cholestyramine (Questran) 4 g packet     Sig: Take 1 packet by mouth 2 (Two) Times a Day As Needed (diarrhea).     Dispense:  100 each     Refill:  1       No orders of the defined types were placed in this encounter.      Return in about 6 months (around 12/14/2023) for HTN, HPL, anxieyt and depression.    Celi Ceballos DO     Transcribed from ambient dictation for Celi Ceballos DO by Catia Alexis.  06/14/23   11:45 EDT    Patient or patient representative verbalized consent to the visit recording.  I have personally performed the services described in this document  as transcribed by the above individual, and it is both accurate and complete.

## 2023-06-14 NOTE — ASSESSMENT & PLAN NOTE
Patient continues to take Prilosec for GERD. We will refer to Dr. Wright with gastroenterology for further evaluation. She was previously seen by Dr. Wright for colonoscopy about 3 years ago.

## 2023-06-15 LAB
ALBUMIN SERPL-MCNC: 4.6 G/DL (ref 3.8–4.8)
ALBUMIN/GLOB SERPL: 1.4 {RATIO} (ref 1.2–2.2)
ALP SERPL-CCNC: 76 IU/L (ref 44–121)
ALT SERPL-CCNC: 8 IU/L (ref 0–32)
AST SERPL-CCNC: 18 IU/L (ref 0–40)
BASOPHILS # BLD AUTO: 0.1 X10E3/UL (ref 0–0.2)
BASOPHILS NFR BLD AUTO: 1 %
BILIRUB SERPL-MCNC: 0.3 MG/DL (ref 0–1.2)
BUN SERPL-MCNC: 14 MG/DL (ref 8–27)
BUN/CREAT SERPL: 13 (ref 12–28)
CALCIUM SERPL-MCNC: 9.8 MG/DL (ref 8.7–10.3)
CHLORIDE SERPL-SCNC: 101 MMOL/L (ref 96–106)
CHOLEST SERPL-MCNC: 214 MG/DL (ref 100–199)
CO2 SERPL-SCNC: 26 MMOL/L (ref 20–29)
CREAT SERPL-MCNC: 1.04 MG/DL (ref 0.57–1)
EGFRCR SERPLBLD CKD-EPI 2021: 61 ML/MIN/1.73
EOSINOPHIL # BLD AUTO: 0.1 X10E3/UL (ref 0–0.4)
EOSINOPHIL NFR BLD AUTO: 2 %
ERYTHROCYTE [DISTWIDTH] IN BLOOD BY AUTOMATED COUNT: 14.3 % (ref 11.7–15.4)
GLOBULIN SER CALC-MCNC: 3.3 G/DL (ref 1.5–4.5)
GLUCOSE SERPL-MCNC: 96 MG/DL (ref 70–99)
HCT VFR BLD AUTO: 36.8 % (ref 34–46.6)
HDLC SERPL-MCNC: 60 MG/DL
HGB BLD-MCNC: 11.7 G/DL (ref 11.1–15.9)
IMM GRANULOCYTES # BLD AUTO: 0 X10E3/UL (ref 0–0.1)
IMM GRANULOCYTES NFR BLD AUTO: 0 %
LDLC SERPL CALC-MCNC: 128 MG/DL (ref 0–99)
LYMPHOCYTES # BLD AUTO: 1.9 X10E3/UL (ref 0.7–3.1)
LYMPHOCYTES NFR BLD AUTO: 29 %
MCH RBC QN AUTO: 26 PG (ref 26.6–33)
MCHC RBC AUTO-ENTMCNC: 31.8 G/DL (ref 31.5–35.7)
MCV RBC AUTO: 82 FL (ref 79–97)
MONOCYTES # BLD AUTO: 0.4 X10E3/UL (ref 0.1–0.9)
MONOCYTES NFR BLD AUTO: 6 %
NEUTROPHILS # BLD AUTO: 4 X10E3/UL (ref 1.4–7)
NEUTROPHILS NFR BLD AUTO: 62 %
PLATELET # BLD AUTO: 357 X10E3/UL (ref 150–450)
POTASSIUM SERPL-SCNC: 4.2 MMOL/L (ref 3.5–5.2)
PROT SERPL-MCNC: 7.9 G/DL (ref 6–8.5)
RBC # BLD AUTO: 4.5 X10E6/UL (ref 3.77–5.28)
SODIUM SERPL-SCNC: 143 MMOL/L (ref 134–144)
TRIGL SERPL-MCNC: 145 MG/DL (ref 0–149)
VLDLC SERPL CALC-MCNC: 26 MG/DL (ref 5–40)
WBC # BLD AUTO: 6.5 X10E3/UL (ref 3.4–10.8)

## 2023-08-14 RX ORDER — SUMATRIPTAN 50 MG/1
50 TABLET, FILM COATED ORAL
Qty: 9 TABLET | Refills: 2 | Status: SHIPPED | OUTPATIENT
Start: 2023-08-14

## 2023-08-14 RX ORDER — CYCLOBENZAPRINE HCL 5 MG
5 TABLET ORAL 3 TIMES DAILY PRN
Qty: 90 TABLET | Refills: 0 | Status: SHIPPED | OUTPATIENT
Start: 2023-08-14

## 2023-10-20 RX ORDER — DESVENLAFAXINE SUCCINATE 50 MG/1
50 TABLET, EXTENDED RELEASE ORAL DAILY
Qty: 90 TABLET | Refills: 0 | Status: SHIPPED | OUTPATIENT
Start: 2023-10-20

## 2023-11-27 ENCOUNTER — OFFICE VISIT (OUTPATIENT)
Dept: INTERNAL MEDICINE | Facility: CLINIC | Age: 63
End: 2023-11-27
Payer: COMMERCIAL

## 2023-11-27 VITALS
HEART RATE: 78 BPM | TEMPERATURE: 98 F | OXYGEN SATURATION: 97 % | HEIGHT: 64 IN | BODY MASS INDEX: 31.41 KG/M2 | WEIGHT: 184 LBS | DIASTOLIC BLOOD PRESSURE: 82 MMHG | SYSTOLIC BLOOD PRESSURE: 122 MMHG

## 2023-11-27 DIAGNOSIS — K44.9 PARAESOPHAGEAL HERNIA: Primary | ICD-10-CM

## 2023-11-27 PROCEDURE — 99213 OFFICE O/P EST LOW 20 MIN: CPT | Performed by: FAMILY MEDICINE

## 2023-11-27 NOTE — ASSESSMENT & PLAN NOTE
[facility] paperwork has been completed today for the patient as she has had to miss several days of work regarding appointments and procedures related to GERD, hematemesis, nausea, etc. She will follow up with Dr. Rina Wright and Dr. Magdiel Marion for further treatment with upcoming surgery to correct the hernia.

## 2023-11-27 NOTE — PROGRESS NOTES
Rosemarie Wright is a 63 y.o. female.    Chief Complaint   Patient presents with    FMLA     Needing paperwork for FMLA for doctor appointments.   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  Her surgery date is 01/02/2024, and the surgeon will take over FMLA from there.        HPI   Rosemarie Wright presents today to discuss FMLA paperwork for frequent doctor's appointments and surgery.    She is supposed to have surgery on 01/02/2024 with Dr. Magdiel Marion for a paraesophageal hernia repair with g tube. She saw Dr. Wright several times and had undergone an esophagogastroduodenoscopy on 10/17/2023. She missed work on those days because of the diagnostic testing. One day she had received a vaccine and woke up the next day with swelling. She talked to Dr. Wright and Dr. Marion about it and they thought it could have been attributed to her esophagus. She does not miss work unless she has a doctor's appointment. She was prescribed Xifaxan, but her insurance company denied it. She is scheduled for an appointment with Dr. Marion on 12/01/2023. She was vomiting blood. She is having a lot of stomach upset and tenderness in her upper abdomen. She always felt like there was a huge clamp on her stomach. She has a little bit of chest pain. She feels short of breath on exertion. She still has nausea and occasionally vomiting with blood.      The following portions of the patient's history were reviewed and updated as appropriate: allergies, current medications, past family history, past medical history, past social history, past surgical history and problem list.     No Known Allergies      Current Outpatient Medications:     Blood Glucose Monitoring Suppl (ONE TOUCH ULTRA MINI) w/Device kit, USE TO CHECK BLOOD SUGAR LEVEL DAILY, Disp: , Rfl:     cholestyramine (Questran) 4 g packet, Take 1 packet by mouth 2 (Two) Times a Day As Needed (diarrhea)., Disp: 100 each, Rfl: 1     "cyclobenzaprine (FLEXERIL) 5 MG tablet, Take 1 tablet by mouth 3 (Three) Times a Day As Needed for Muscle Spasms., Disp: 90 tablet, Rfl: 0    desvenlafaxine (PRISTIQ) 50 MG 24 hr tablet, Take 1 tablet by mouth Daily., Disp: 90 tablet, Rfl: 0    losartan-hydrochlorothiazide (HYZAAR) 50-12.5 MG per tablet, Take 1 tablet by mouth Daily., Disp: , Rfl:     montelukast (SINGULAIR) 10 MG tablet, Take 1 tablet by mouth Every Night., Disp: , Rfl:     omeprazole (priLOSEC) 20 MG capsule, Take 1 capsule by mouth Daily., Disp: 90 capsule, Rfl: 2    OneTouch Delica Lancets 33G misc, USE TO CHECK BLOOD SUGAR LEVEL DAILY, Disp: , Rfl:     OneTouch Ultra test strip, USE TO CHECK BLOOD SUGAR LEVEL DAILY, Disp: , Rfl:     propranolol (INDERAL) 20 MG tablet, Take 1 tablet by mouth 3 (Three) Times a Day., Disp: , Rfl:     rosuvastatin (CRESTOR) 40 MG tablet, Take 1 tablet by mouth Every Night., Disp: , Rfl:     SUMAtriptan (IMITREX) 50 MG tablet, Take 1 tablet by mouth Every 2 (Two) Hours As Needed for Migraine. Take one tablet at onset of headache. May repeat dose one time in 2 hours if headache not relieved., Disp: 9 tablet, Rfl: 2    Tretinoin (Retinoic Acid) powder, , Disp: , Rfl:     ROS    Review of Systems   Respiratory:  Negative for shortness of breath.    Cardiovascular:  Negative for chest pain.   Gastrointestinal:  Positive for abdominal pain and nausea.       Vitals:    11/27/23 1106   BP: 122/82   BP Location: Left arm   Patient Position: Sitting   Cuff Size: Adult   Pulse: 78   Temp: 98 °F (36.7 °C)   SpO2: 97%   Weight: 83.5 kg (184 lb)   Height: 162.6 cm (64.02\")     Body mass index is 31.56 kg/m².      Physical Exam     Physical Exam  Constitutional:       General: She is not in acute distress.     Appearance: Normal appearance. She is well-developed.   HENT:      Head: Normocephalic and atraumatic.      Right Ear: External ear normal.      Left Ear: External ear normal.   Eyes:      Extraocular Movements: Extraocular " movements intact.      Conjunctiva/sclera: Conjunctivae normal.   Cardiovascular:      Rate and Rhythm: Normal rate and regular rhythm.      Heart sounds: No murmur heard.  Pulmonary:      Effort: Pulmonary effort is normal. No respiratory distress.      Breath sounds: Normal breath sounds. No wheezing.   Abdominal:      General: Bowel sounds are normal. There is no distension.      Palpations: Abdomen is soft.      Tenderness: There is no abdominal tenderness in the right upper quadrant, right lower quadrant, epigastric area, left upper quadrant and left lower quadrant.      Comments: Significant amount of full abdominal tenderness that is worse in the epigastric region.   Skin:     General: Skin is warm and dry.   Neurological:      Mental Status: She is alert and oriented to person, place, and time.      Cranial Nerves: No cranial nerve deficit.   Psychiatric:         Mood and Affect: Mood normal.         Behavior: Behavior normal.       Assessment/Plan    Diagnoses and all orders for this visit:    1. Paraesophageal hernia (Primary)  Assessment & Plan:  [facility] paperwork has been completed today for the patient as she has had to miss several days of work regarding appointments and procedures related to GERD, hematemesis, nausea, etc. She will follow up with Dr. Rina Wright and Dr. Magdiel Marion for further treatment with upcoming surgery to correct the hernia.            No orders of the defined types were placed in this encounter.      No orders of the defined types were placed in this encounter.      Return for Next scheduled follow up.    Celi Ceballos DO       Transcribed from ambient dictation for Celi Ceballos DO by Suzi Soria.  11/27/23   13:49 EST    Patient or patient representative verbalized consent to the visit recording.  I have personally performed the services described in this document as transcribed by the above individual, and it is both accurate and complete.  Celi Ceballos  DO  12/10/2023  18:55 EST

## 2023-11-30 ENCOUNTER — PATIENT MESSAGE (OUTPATIENT)
Dept: INTERNAL MEDICINE | Facility: CLINIC | Age: 63
End: 2023-11-30
Payer: COMMERCIAL

## 2023-11-30 NOTE — TELEPHONE ENCOUNTER
From: Rosemarie Wright  To: Celi Ceballos  Sent: 11/30/2023 2:24 PM EST  Subject: Work skip    Hello    Can you email a work excuse to [email or web address] indicating I was sen by Dr. Ceballos in the 22nd.     Also, need a comment indicating I am able to return to work on this date with no work restrictions.      Thank you,  Rosemarie Wright

## 2023-12-07 NOTE — ANESTHESIA POSTPROCEDURE EVALUATION
Patient: Rosemarie Wright    Procedure Summary     Date: 12/09/21 Room / Location:  [facility] 04 /  [facility]    Anesthesia Start: 1537 Anesthesia Stop: 1630    Procedure: CHOLECYSTECTOMY LAPAROSCOPIC WITH DAVINCI ROBOT (N/A Abdomen) Diagnosis:       Biliary dyskinesia      (Biliary dyskinesia [K82.8])    Surgeons: Mateo Wong MD Provider: Palomo Jefferson MD    Anesthesia Type: general ASA Status: 2          Anesthesia Type: general    Vitals  Vitals Value Taken Time   /87 12/09/21 1711   Temp 98 °F (36.7 °C) 12/09/21 1630   Pulse 73 12/09/21 1711   Resp 12 12/09/21 1711   SpO2 95 % 12/09/21 1711           Anesthesia Post Evaluation   Subjective   Patient ID: Adan Truong is a 12 y.o. male who presents for sleep symptoms.    Adan was seen last by Dr Gruber 9/9/2022 whom recommended monitoring sleep, vs sleep study, vs T&A    HPI  Adan has been snoring and mouth breathing at night. Has been bedwetting as long as they can remember. Sleepy during the day and difficult to wake. No morning headaches. Takes focalin and guanfesine once daily, started 1-1.5 years ago. Quick to anger, gets worse when sleepy. No episodes of strep/tonsillitis. No ear infections. No congestion during the day. Deny allergy symptoms. Healthy otherwise, no medical history. No surgical history.     His mother doesn't hear loud snoring but she does hear audible gasping/apnea at night, and states he has been having that since quite some time atleast over the last 3-4 years.     Review of Systems   All other systems reviewed and are negative.    Objective   Physical Exam  PHYSICAL EXAMINATION:  General Healthy-appearing, well-nourished, well groomed, in no acute distress.   Neuro: Developmentally appropriate for age. Reacts appropriately to commands or stimuli.   Extremities Normal. Good tone.  Respiratory No increased work of breathing. Chest expands symmetrically. No stertor or stridor at rest.  Cardiovascular: No peripheral cyanosis. No jugular venous distension.   Head and Face: Atraumatic with no masses, lesions, or scarring. Salivary glands normal without tenderness or palpable masses.  Eyes: EOM intact, conjunctiva non-injected, sclera white.   Ears:  Right Ear  External inspection of ears:  Right pinna normally formed and free of lesions. No preauricular pits. No mastoid tenderness.  Otoscopic examination:   Right auditory canal has normal appearance and no significant cerumen obstruction. No erythema. Tympanic membrane is mobile per pneumatic otoscopy, translucent, with clear landmarks and no evidence of middle ear effusion  Left Ear  External inspection of ears:  Left  pinna normally formed and free of lesions. No preauricular pits. No mastoid tenderness.  Otoscopic examination:   Left auditory canal has normal appearance and no significant cerumen obstruction . No erythema. Tympanic membrane is  mobile per pneumatic otoscopy, translucent, with clear landmarks and no evidence of middle ear effusion  Nose: no external nasal lesions, lacerations, or scars. Nasal mucosa normal, pink and moist. Septum is midline. Turbinates are non enlarged  No obvious polyps.   Oral Cavity: Lips, tongue, teeth, and gums: mucous membranes moist, no lesions  Oropharynx: Mucosa moist, no lesions. Soft palate normal. Normal posterior pharyngeal wall. Tonsils 3+.   Neck: Symmetrical, trachea midline.  non-palpable   Skin: Normal without rashes or lesions.       Assessment/Plan   Problem List Items Addressed This Visit             ICD-10-CM    Sleep disorder breathing - Primary G47.30    Relevant Orders    In-Center Sleep Study (Pediatric or [place])      12 yr old male with sleep disordered breathing and enlarged tonsils.      We discussed his case in great detail.   His mom would like to try and get a PSG for more information but also wants to schedule for tonsillectomy given end of year time constraints.   We discussed he does not require the PSG given she hears audible gasping however she wanted the information if possible. I explained the staffing contraints and feel if they can get it done that is great but if not we can still proceed with T&A.    T&A  Today we recommend the following procedures: 1.) Tonsillectomy. Benefits were discussed include possibility of better breathing and sleep and less infections. Risks were discussed including: a 1 in 25 chance of bleeding, a 1 in 500 chance of transfusion, a 1 in 100,000 chance of life-threatening bleeding or death. 2.) Adenoidectomy. Benefits were discussed and include possibility of better breathing and sleep and less infections. Risks were discussed  including less than 1% chance of 3 problems; 1) bleeding, 2) stiff neck requiring temporary placement of soft neck collar, 3) a possible speech issue involving the palate that usually resolves itself after 2 months, but may occasionally require speech therapy or rarely (1 in 1000) surgery to repair it. A full history and physical examination, informed consent and preoperative teaching, planning and arrangements have been performed.

## 2023-12-14 ENCOUNTER — OFFICE VISIT (OUTPATIENT)
Dept: INTERNAL MEDICINE | Facility: CLINIC | Age: 63
End: 2023-12-14
Payer: COMMERCIAL

## 2023-12-14 VITALS
TEMPERATURE: 98 F | WEIGHT: 181 LBS | DIASTOLIC BLOOD PRESSURE: 82 MMHG | HEIGHT: 64 IN | HEART RATE: 70 BPM | SYSTOLIC BLOOD PRESSURE: 122 MMHG | OXYGEN SATURATION: 98 % | BODY MASS INDEX: 30.9 KG/M2

## 2023-12-14 DIAGNOSIS — I10 PRIMARY HYPERTENSION: Primary | ICD-10-CM

## 2023-12-14 DIAGNOSIS — F41.9 ANXIETY AND DEPRESSION: ICD-10-CM

## 2023-12-14 DIAGNOSIS — F32.A ANXIETY AND DEPRESSION: ICD-10-CM

## 2023-12-14 DIAGNOSIS — E78.5 HYPERLIPIDEMIA, UNSPECIFIED HYPERLIPIDEMIA TYPE: ICD-10-CM

## 2023-12-14 PROCEDURE — 99214 OFFICE O/P EST MOD 30 MIN: CPT | Performed by: FAMILY MEDICINE

## 2023-12-14 RX ORDER — DESVENLAFAXINE 100 MG/1
100 TABLET, EXTENDED RELEASE ORAL DAILY
Qty: 90 TABLET | Refills: 3 | Status: SHIPPED | OUTPATIENT
Start: 2023-12-14

## 2023-12-14 NOTE — PROGRESS NOTES
Rosemarie Wright is a 63 y.o. female.    Chief Complaint   Patient presents with    Hypertension    Hyperlipidemia    Anxiety     Feels like her anxiety has increased.     Depression       HPI     Rosemarie Wright is a 63-year-old female with date of birth 1960. She presents today to follow up on hypertension, hyperlipidemia, as well as anxiety and depression.    Patient has hypertension. They are taking losartan-hydrochlorothiazide. They have been compliant with medications. The patient denies any side effects to the medication. Blood pressure is controlled in the office today. Blood pressure has been running 122/82 mmHg. They are following a low salt diet. They are active.    Patient has had hyperlipidemia for few years. She has been compliant with low fat diet. She has been compliant with taking the medications, without side effects. Her weight is down compared to last visit. She is active, and frequently exercises. She has been slowly increasing her exercises.    The patient is having increased anxiety. She thinks her anxiety is related to her upcoming surgery and her divorce becoming difficult. She has been able to manage her situations for the most part. The Pristiq has been helpful and a blessing to her. She is agreeable to increase the dose if it will help decrease her anxiety. The patient is mainly feeling overwhelmed and denies feeling sad, down, hopeless or worthless. She continues taking propranolol. Her energy has been fluctuating and is not constantly low or normal. She has been sleeping well.     The patient will occasionally have chest pain. She has discussed the chest pain with Dr. Marion, her surgeon, and was informed this may be related to the hernia she has. She was told the hernia could make her feel as if she is having a heart attack. She will occasionally feel short of breath.     The patient received her influenza, RSV and COVID-19 vaccines in 10/13/2023. She received the Moderna  COVID-19 vaccine.     The following portions of the patient's history were reviewed and updated as appropriate: allergies, current medications, past family history, past medical history, past social history, past surgical history and problem list.     No Known Allergies      Current Outpatient Medications:     Blood Glucose Monitoring Suppl (ONE TOUCH ULTRA MINI) w/Device kit, USE TO CHECK BLOOD SUGAR LEVEL DAILY, Disp: , Rfl:     cholestyramine (Questran) 4 g packet, Take 1 packet by mouth 2 (Two) Times a Day As Needed (diarrhea)., Disp: 100 each, Rfl: 1    cyclobenzaprine (FLEXERIL) 5 MG tablet, Take 1 tablet by mouth 3 (Three) Times a Day As Needed for Muscle Spasms., Disp: 90 tablet, Rfl: 0    desvenlafaxine (PRISTIQ) 100 MG 24 hr tablet, Take 1 tablet by mouth Daily., Disp: 90 tablet, Rfl: 3    losartan-hydrochlorothiazide (HYZAAR) 50-12.5 MG per tablet, Take 1 tablet by mouth Daily., Disp: , Rfl:     montelukast (SINGULAIR) 10 MG tablet, Take 1 tablet by mouth Every Night., Disp: , Rfl:     omeprazole (priLOSEC) 20 MG capsule, Take 1 capsule by mouth Daily., Disp: 90 capsule, Rfl: 2    OneTouch Delica Lancets 33G misc, USE TO CHECK BLOOD SUGAR LEVEL DAILY, Disp: , Rfl:     OneTouch Ultra test strip, USE TO CHECK BLOOD SUGAR LEVEL DAILY, Disp: , Rfl:     propranolol (INDERAL) 20 MG tablet, Take 1 tablet by mouth 3 (Three) Times a Day., Disp: , Rfl:     rosuvastatin (CRESTOR) 40 MG tablet, Take 1 tablet by mouth Every Night., Disp: , Rfl:     SUMAtriptan (IMITREX) 50 MG tablet, Take 1 tablet by mouth Every 2 (Two) Hours As Needed for Migraine. Take one tablet at onset of headache. May repeat dose one time in 2 hours if headache not relieved., Disp: 9 tablet, Rfl: 2    Tretinoin (Retinoic Acid) powder, , Disp: , Rfl:     ROS    Review of Systems   Constitutional:  Positive for fatigue. Negative for chills and fever.   HENT:  Negative for congestion, postnasal drip, rhinorrhea and sore throat.    Eyes:  Positive  "for blurred vision. Negative for visual disturbance.   Respiratory:  Positive for shortness of breath. Negative for cough and wheezing.    Cardiovascular:  Positive for chest pain and palpitations. Negative for leg swelling.   Gastrointestinal:  Positive for nausea. Negative for abdominal pain, constipation, diarrhea and vomiting.   Psychiatric/Behavioral:  Negative for sleep disturbance and depressed mood. The patient is nervous/anxious.        Vitals:    12/14/23 0808   BP: 122/82   BP Location: Right arm   Patient Position: Sitting   Cuff Size: Adult   Pulse: 70   Temp: 98 °F (36.7 °C)   SpO2: 98%   Weight: 82.1 kg (181 lb)   Height: 162.6 cm (64.02\")   PainSc: 0-No pain         Physical Exam     Physical Exam  Constitutional:       General: She is not in acute distress.     Appearance: Normal appearance. She is well-developed.   HENT:      Head: Normocephalic and atraumatic.      Right Ear: External ear normal.      Left Ear: External ear normal.   Eyes:      Extraocular Movements: Extraocular movements intact.      Conjunctiva/sclera: Conjunctivae normal.   Cardiovascular:      Rate and Rhythm: Normal rate and regular rhythm.      Heart sounds: No murmur heard.  Pulmonary:      Effort: Pulmonary effort is normal. No respiratory distress.      Breath sounds: Normal breath sounds. No wheezing.   Abdominal:      General: Bowel sounds are normal. There is no distension.      Palpations: Abdomen is soft.      Tenderness: There is abdominal tenderness.      Comments: Diffuse abdominal tenderness.   Musculoskeletal:      Right lower leg: No edema.      Left lower leg: No edema.   Skin:     General: Skin is warm and dry.   Neurological:      Mental Status: She is alert and oriented to person, place, and time.      Cranial Nerves: No cranial nerve deficit.   Psychiatric:         Mood and Affect: Mood normal.         Behavior: Behavior normal.         Assessment/Plan    Diagnoses and all orders for this visit:    1. " Primary hypertension (Primary)  Assessment & Plan:  Hypertension is controlled on current medications. She will continue losartan-hydrochlorothiazide.      2. Hyperlipidemia, unspecified hyperlipidemia type  Assessment & Plan:  Stable per recent labs. We will continue Crestor and monitor her lipid panel once yearly.      3. Anxiety and depression  Assessment & Plan:  This is uncontrolled. We will increase Pristiq. She may continue propranolol as needed.       Other orders  -     desvenlafaxine (PRISTIQ) 100 MG 24 hr tablet; Take 1 tablet by mouth Daily.  Dispense: 90 tablet; Refill: 3        New Medications Ordered This Visit   Medications    desvenlafaxine (PRISTIQ) 100 MG 24 hr tablet     Sig: Take 1 tablet by mouth Daily.     Dispense:  90 tablet     Refill:  3       No orders of the defined types were placed in this encounter.      Return in about 6 months (around 6/14/2024) for Annual.    Celi Ceballos DO        Transcribed from ambient dictation for Celi Ceballos DO by Rosemarie Hirsch.  12/14/23   09:16 EST    Patient or patient representative verbalized consent to the visit recording.  I have personally performed the services described in this document as transcribed by the above individual, and it is both accurate and complete.  Celi Ceballos DO  12/14/2023  12:47 EST

## 2023-12-14 NOTE — ASSESSMENT & PLAN NOTE
Hypertension is controlled on current medications. She will continue losartan-hydrochlorothiazide.

## 2024-03-09 RX ORDER — LOSARTAN POTASSIUM AND HYDROCHLOROTHIAZIDE 12.5; 5 MG/1; MG/1
1 TABLET ORAL DAILY
Qty: 90 TABLET | Refills: 3 | Status: SHIPPED | OUTPATIENT
Start: 2024-03-09

## 2024-03-09 RX ORDER — CYCLOBENZAPRINE HCL 5 MG
5 TABLET ORAL 3 TIMES DAILY PRN
Qty: 90 TABLET | Refills: 0 | Status: SHIPPED | OUTPATIENT
Start: 2024-03-09

## 2024-06-10 RX ORDER — PROPRANOLOL HYDROCHLORIDE 20 MG/1
20 TABLET ORAL 3 TIMES DAILY
Qty: 270 TABLET | Refills: 0 | Status: SHIPPED | OUTPATIENT
Start: 2024-06-10

## 2024-06-20 ENCOUNTER — HOSPITAL ENCOUNTER (OUTPATIENT)
Dept: GENERAL RADIOLOGY | Facility: HOSPITAL | Age: 64
Discharge: HOME | End: 2024-06-20
Admitting: FAMILY MEDICINE
Payer: COMMERCIAL

## 2024-06-20 ENCOUNTER — OFFICE VISIT (OUTPATIENT)
Dept: INTERNAL MEDICINE | Facility: CLINIC | Age: 64
End: 2024-06-20
Payer: COMMERCIAL

## 2024-06-20 VITALS
TEMPERATURE: 98 F | DIASTOLIC BLOOD PRESSURE: 78 MMHG | HEIGHT: 64 IN | OXYGEN SATURATION: 98 % | SYSTOLIC BLOOD PRESSURE: 118 MMHG | WEIGHT: 167 LBS | HEART RATE: 78 BPM | BODY MASS INDEX: 28.51 KG/M2

## 2024-06-20 DIAGNOSIS — G89.29 CHRONIC PAIN OF BOTH KNEES: ICD-10-CM

## 2024-06-20 DIAGNOSIS — E78.5 HYPERLIPIDEMIA, UNSPECIFIED HYPERLIPIDEMIA TYPE: ICD-10-CM

## 2024-06-20 DIAGNOSIS — F41.9 ANXIETY AND DEPRESSION: ICD-10-CM

## 2024-06-20 DIAGNOSIS — M25.562 CHRONIC PAIN OF BOTH KNEES: ICD-10-CM

## 2024-06-20 DIAGNOSIS — Z12.31 SCREENING MAMMOGRAM FOR BREAST CANCER: ICD-10-CM

## 2024-06-20 DIAGNOSIS — Z00.00 WELL ADULT EXAM: Primary | ICD-10-CM

## 2024-06-20 DIAGNOSIS — I10 PRIMARY HYPERTENSION: ICD-10-CM

## 2024-06-20 DIAGNOSIS — M25.561 CHRONIC PAIN OF BOTH KNEES: ICD-10-CM

## 2024-06-20 DIAGNOSIS — F32.A ANXIETY AND DEPRESSION: ICD-10-CM

## 2024-06-20 PROCEDURE — 99396 PREV VISIT EST AGE 40-64: CPT | Performed by: FAMILY MEDICINE

## 2024-06-20 PROCEDURE — 73560 X-RAY EXAM OF KNEE 1 OR 2: CPT

## 2024-06-20 RX ORDER — PANTOPRAZOLE SODIUM 40 MG/1
40 TABLET, DELAYED RELEASE ORAL DAILY
COMMUNITY

## 2024-06-20 RX ORDER — PROPRANOLOL HCL 60 MG
60 CAPSULE, EXTENDED RELEASE 24HR ORAL DAILY
Qty: 90 CAPSULE | Refills: 2 | Status: CANCELLED | OUTPATIENT
Start: 2024-06-20

## 2024-06-20 RX ORDER — SUMATRIPTAN 50 MG/1
50 TABLET, FILM COATED ORAL
Qty: 9 TABLET | Refills: 2 | Status: SHIPPED | OUTPATIENT
Start: 2024-06-20

## 2024-06-20 RX ORDER — DESVENLAFAXINE 100 MG/1
100 TABLET, EXTENDED RELEASE ORAL DAILY
Qty: 90 TABLET | Refills: 3 | Status: SHIPPED | OUTPATIENT
Start: 2024-06-20

## 2024-06-20 RX ORDER — PROPRANOLOL HCL 60 MG
60 CAPSULE, EXTENDED RELEASE 24HR ORAL DAILY
Qty: 90 CAPSULE | Refills: 3 | Status: SHIPPED | OUTPATIENT
Start: 2024-06-20

## 2024-06-20 RX ORDER — LINACLOTIDE 145 UG/1
145 CAPSULE, GELATIN COATED ORAL
COMMUNITY

## 2024-06-20 RX ORDER — ROSUVASTATIN CALCIUM 40 MG/1
40 TABLET, COATED ORAL NIGHTLY
Qty: 90 TABLET | Refills: 3 | Status: SHIPPED | OUTPATIENT
Start: 2024-06-20

## 2024-06-20 RX ORDER — CYCLOBENZAPRINE HCL 5 MG
5 TABLET ORAL 3 TIMES DAILY PRN
Qty: 90 TABLET | Refills: 0 | Status: SHIPPED | OUTPATIENT
Start: 2024-06-20

## 2024-06-20 NOTE — PROGRESS NOTES
Rosemarie Wright is a 64 y.o. female.    Chief Complaint   Patient presents with    Annual Exam       HPI   History of Present Illness  The patient presents today for an annual physical exam. She is also following up on hypertension, hyperlipidemia, anxiety, and depression.    The patient reports experiencing bilateral knee pain for the past 6 months, accompanied by bilateral neck swelling. A CT scan has been ordered by Dr. Wright, a gastroenterologist, due to associated tenderness. She expresses a desire to schedule a mammogram. An upper endoscopy was performed by Dr. Wright in late 11/2023 or early 12/2023, along with a colonoscopy performed the year prior. She maintains a healthy diet and engages in moderate physical activity, including treadmill walking in her neighborhood. She plans to commence [employer] and plans to attend giftee upon her relocation. Her ophthalmological examination is scheduled for 3 months from now, and her dental check-up is overdue. She denies experiencing chest pain, but experiences occasional shortness of breath, which she attributes to her recent surgery. She denies experiencing nausea, vomiting, diarrhea, or constipation. She also denies experiencing cough, congestion, or runny nose. She also denies experiencing itchy, watery eyes. Her energy levels are satisfactory, and she denies experiencing hot flashes or cold chills. She denies any urinary issues, rashes, or bruises. She experiences occasional migraines, which she attributes to stress. She requests a refill of propranolol HCL extended release 60 mg, which she reports as beneficial for her anxiety and migraines. She denies frequent nervousness. She takes Imitrex as needed for migraines, which she reports as effective, and requests a refill. Her feelings of sadness and depression have decreased, and she believes Pristiq is effective. She monitors her blood pressure at home, which she reports as within normal limits. She  denies excessive salt intake or consumption of fatty or greasy foods. She requests a refill of Crestor for her cholesterol. Her last Pap smear was over 5 years ago. She reports feeling fullness in her ears.   She had RSV vaccine and the newest COVID-19 vaccine. She had both shingles vaccines. She had tetanus vaccine last year. She had influenza vaccine last season.    The following portions of the patient's history were reviewed and updated as appropriate: allergies, current medications, past family history, past medical history, past social history, past surgical history and problem list.     Past Medical History:   Diagnosis Date    Allergic 2/1999    Seasonal allergies    Anxiety     Cholelithiasis 12/2021    Gallbladder removed Dr. Mateo Juárez [place]    Depression 12/2003    Medication    Diabetes mellitus 02/2023    Prediabetes    Elevated cholesterol     GERD (gastroesophageal reflux disease)     Hyperlipidemia     Hypertension     Migraine     Obesity 2023    Sleep apnea     no c-pap    Visual impairment 1976    Medication       Past Surgical History:   Procedure Laterality Date    CHOLECYSTECTOMY N/A 12/09/2021    Procedure: CHOLECYSTECTOMY LAPAROSCOPIC WITH DAVINCI ROBOT;  Surgeon: Mateo Wong MD;  Location: [facility];  Service: Robotics - DaVinci;  Laterality: N/A;    COLONOSCOPY      LAPAROSCOPIC TUBAL LIGATION W/ FILSHIE CLIPS      NASAL SINUS SURGERY      deviated septum       Family History   Problem Relation Age of Onset    Breast cancer Mother     Arthritis Father     Hyperlipidemia Father     Depression Father     Heart disease Maternal Grandmother        Social History     Socioeconomic History    Marital status: Legally    Tobacco Use    Smoking status: Never     Passive exposure: Never    Smokeless tobacco: Never    Tobacco comments:     Never smoked do not like the smell   Vaping Use    Vaping status: Never Used   Substance and Sexual Activity    Alcohol use: Never    Drug  use: Never    Sexual activity: Not Currently     Partners: Male     Birth control/protection: Essure, Implant       No Known Allergies      Current Outpatient Medications:     cyclobenzaprine (FLEXERIL) 5 MG tablet, Take 1 tablet by mouth 3 (Three) Times a Day As Needed for Muscle Spasms., Disp: 90 tablet, Rfl: 0    desvenlafaxine (PRISTIQ) 100 MG 24 hr tablet, Take 1 tablet by mouth Daily., Disp: 90 tablet, Rfl: 3    Linzess 145 MCG capsule capsule, Take 1 capsule by mouth Every Morning Before Breakfast., Disp: , Rfl:     losartan-hydrochlorothiazide (HYZAAR) 50-12.5 MG per tablet, Take 1 tablet by mouth Daily., Disp: 90 tablet, Rfl: 3    montelukast (SINGULAIR) 10 MG tablet, Take 1 tablet by mouth Every Night., Disp: , Rfl:     pantoprazole (PROTONIX) 40 MG EC tablet, Take 1 tablet by mouth Daily., Disp: , Rfl:     rosuvastatin (CRESTOR) 40 MG tablet, Take 1 tablet by mouth Every Night., Disp: 90 tablet, Rfl: 3    SUMAtriptan (IMITREX) 50 MG tablet, Take 1 tablet by mouth Every 2 (Two) Hours As Needed for Migraine. Take one tablet at onset of headache. May repeat dose one time in 2 hours if headache not relieved., Disp: 9 tablet, Rfl: 2    Tretinoin (Retinoic Acid) powder, , Disp: , Rfl:     propranolol LA (Inderal LA) 60 MG 24 hr capsule, Take 1 capsule by mouth Daily., Disp: 90 capsule, Rfl: 3    ROS    Review of Systems   Constitutional:  Negative for chills, fatigue and fever.   HENT:  Negative for congestion and rhinorrhea.    Eyes:  Negative for discharge and itching.   Respiratory:  Positive for shortness of breath (occasionally). Negative for cough.    Cardiovascular:  Negative for chest pain.   Gastrointestinal:  Positive for abdominal pain. Negative for constipation, diarrhea, nausea and vomiting.   Endocrine: Negative for cold intolerance and heat intolerance.   Genitourinary:  Negative for difficulty urinating.   Musculoskeletal:  Positive for arthralgias.   Skin:  Negative for rash.  "  Allergic/Immunologic: Negative for environmental allergies.   Neurological:  Negative for headache.   Hematological:  Does not bruise/bleed easily.   Psychiatric/Behavioral:  Negative for depressed mood. The patient is not nervous/anxious.        Vitals:    06/20/24 0818   BP: 118/78   BP Location: Right arm   Patient Position: Sitting   Cuff Size: Adult   Pulse: 78   Temp: 98 °F (36.7 °C)   SpO2: 98%   Weight: 75.8 kg (167 lb)   Height: 162.6 cm (64.02\")   PainSc:   3     Body mass index is 28.65 kg/m².    Physical Exam     Physical Exam  Constitutional:       General: She is not in acute distress.     Appearance: Normal appearance. She is well-developed.   HENT:      Head: Normocephalic and atraumatic.      Right Ear: External ear normal.      Left Ear: External ear normal.   Eyes:      Extraocular Movements: Extraocular movements intact.      Conjunctiva/sclera: Conjunctivae normal.   Neck:      Comments: Slight swelling to the shoulders proximally at the base of the neck.  When palpated, appears to be fatty tissue.   Cardiovascular:      Rate and Rhythm: Normal rate and regular rhythm.      Heart sounds: No murmur heard.  Pulmonary:      Effort: Pulmonary effort is normal. No respiratory distress.      Breath sounds: Normal breath sounds. No wheezing.   Abdominal:      General: Bowel sounds are normal. There is no distension.      Palpations: Abdomen is soft.      Tenderness: There is no abdominal tenderness.   Musculoskeletal:      Cervical back: Neck supple.      Right knee: Crepitus (mild) present. Tenderness present over the medial joint line and lateral joint line.      Left knee: Crepitus (mild) present. Tenderness present over the medial joint line and lateral joint line.      Right lower leg: No edema.      Left lower leg: No edema.   Lymphadenopathy:      Cervical: No cervical adenopathy.   Skin:     General: Skin is warm and dry.   Neurological:      Mental Status: She is alert and oriented to " person, place, and time.      Cranial Nerves: No cranial nerve deficit.      Deep Tendon Reflexes: Reflexes normal.   Psychiatric:         Mood and Affect: Mood normal.         Behavior: Behavior normal.             Diagnoses and all orders for this visit:    1. Well adult exam (Primary)    2. Hyperlipidemia, unspecified hyperlipidemia type  -     Lipid Panel    3. Anxiety and depression    4. Primary hypertension  -     CBC & Differential  -     Comprehensive Metabolic Panel  -     TSH    5. Screening mammogram for breast cancer  -     Mammo Screening Digital Tomosynthesis Bilateral With CAD    6. Chronic pain of both knees  -     XR Knee 1 or 2 View Bilateral    Other orders  -     desvenlafaxine (PRISTIQ) 100 MG 24 hr tablet; Take 1 tablet by mouth Daily.  Dispense: 90 tablet; Refill: 3  -     SUMAtriptan (IMITREX) 50 MG tablet; Take 1 tablet by mouth Every 2 (Two) Hours As Needed for Migraine. Take one tablet at onset of headache. May repeat dose one time in 2 hours if headache not relieved.  Dispense: 9 tablet; Refill: 2  -     propranolol LA (Inderal LA) 60 MG 24 hr capsule; Take 1 capsule by mouth Daily.  Dispense: 90 capsule; Refill: 3  -     rosuvastatin (CRESTOR) 40 MG tablet; Take 1 tablet by mouth Every Night.  Dispense: 90 tablet; Refill: 3  -     cyclobenzaprine (FLEXERIL) 5 MG tablet; Take 1 tablet by mouth 3 (Three) Times a Day As Needed for Muscle Spasms.  Dispense: 90 tablet; Refill: 0        Assessment & Plan  1. Well-adult exam.  The patient and I have discussed routine health maintenance, including vaccines, dental/eye health, a healthy diet, exercise, colorectal cancer screening, breast cancer screening, and Pap smears. The names of a local gynecologist have been provided to her today to arrange an appointment until this has been addressed. Routine labs and a mammogram have been ordered. A record of her most recent colonoscopy from her gastroenterologist will be obtained.    2.  Hyperlipidemia.  An updated lipid panel will be obtained. She is advised to continue with Crestor.    3. Anxiety and depression.  Her condition is stable with the current medication. A refill of propranolol 60 mg and Pristiq will be provided.    4. Hypertension.  Her hypertension is well-managed with her current medication. She is advised to continue with losartan/hydrochlorothiazide.    5. Chronic pain of both knees.  She is encouraged to apply Voltaren gel up to 4 times daily. X-rays of the knees will be obtained.    New Medications Ordered This Visit   Medications    desvenlafaxine (PRISTIQ) 100 MG 24 hr tablet     Sig: Take 1 tablet by mouth Daily.     Dispense:  90 tablet     Refill:  3    SUMAtriptan (IMITREX) 50 MG tablet     Sig: Take 1 tablet by mouth Every 2 (Two) Hours As Needed for Migraine. Take one tablet at onset of headache. May repeat dose one time in 2 hours if headache not relieved.     Dispense:  9 tablet     Refill:  2    propranolol LA (Inderal LA) 60 MG 24 hr capsule     Sig: Take 1 capsule by mouth Daily.     Dispense:  90 capsule     Refill:  3    rosuvastatin (CRESTOR) 40 MG tablet     Sig: Take 1 tablet by mouth Every Night.     Dispense:  90 tablet     Refill:  3    cyclobenzaprine (FLEXERIL) 5 MG tablet     Sig: Take 1 tablet by mouth 3 (Three) Times a Day As Needed for Muscle Spasms.     Dispense:  90 tablet     Refill:  0       No orders of the defined types were placed in this encounter.      Return in about 6 months (around 12/20/2024) for HTN, anxiety and depression, HPL, migraines.      Celi Ceballos DO

## 2024-06-21 LAB
ALBUMIN SERPL-MCNC: 4.4 G/DL (ref 3.5–5.2)
ALBUMIN/GLOB SERPL: 1.6 G/DL
ALP SERPL-CCNC: 76 U/L (ref 39–117)
ALT SERPL-CCNC: 9 U/L (ref 1–33)
AST SERPL-CCNC: 18 U/L (ref 1–32)
BASOPHILS # BLD AUTO: 0.09 10*3/MM3 (ref 0–0.2)
BASOPHILS NFR BLD AUTO: 1.3 % (ref 0–1.5)
BILIRUB SERPL-MCNC: 0.3 MG/DL (ref 0–1.2)
BUN SERPL-MCNC: 12 MG/DL (ref 8–23)
BUN/CREAT SERPL: 11.8 (ref 7–25)
CALCIUM SERPL-MCNC: 9.8 MG/DL (ref 8.6–10.5)
CHLORIDE SERPL-SCNC: 105 MMOL/L (ref 98–107)
CHOLEST SERPL-MCNC: 257 MG/DL (ref 0–200)
CO2 SERPL-SCNC: 28.1 MMOL/L (ref 22–29)
CREAT SERPL-MCNC: 1.02 MG/DL (ref 0.57–1)
EGFRCR SERPLBLD CKD-EPI 2021: 61.9 ML/MIN/1.73
EOSINOPHIL # BLD AUTO: 0.13 10*3/MM3 (ref 0–0.4)
EOSINOPHIL NFR BLD AUTO: 1.9 % (ref 0.3–6.2)
ERYTHROCYTE [DISTWIDTH] IN BLOOD BY AUTOMATED COUNT: 13.1 % (ref 12.3–15.4)
GLOBULIN SER CALC-MCNC: 2.8 GM/DL
GLUCOSE SERPL-MCNC: 93 MG/DL (ref 65–99)
HCT VFR BLD AUTO: 38.1 % (ref 34–46.6)
HDLC SERPL-MCNC: 73 MG/DL (ref 40–60)
HGB BLD-MCNC: 12.3 G/DL (ref 12–15.9)
IMM GRANULOCYTES # BLD AUTO: 0.02 10*3/MM3 (ref 0–0.05)
IMM GRANULOCYTES NFR BLD AUTO: 0.3 % (ref 0–0.5)
LDLC SERPL CALC-MCNC: 160 MG/DL (ref 0–100)
LYMPHOCYTES # BLD AUTO: 2.13 10*3/MM3 (ref 0.7–3.1)
LYMPHOCYTES NFR BLD AUTO: 31.7 % (ref 19.6–45.3)
MCH RBC QN AUTO: 28.5 PG (ref 26.6–33)
MCHC RBC AUTO-ENTMCNC: 32.3 G/DL (ref 31.5–35.7)
MCV RBC AUTO: 88.2 FL (ref 79–97)
MONOCYTES # BLD AUTO: 0.48 10*3/MM3 (ref 0.1–0.9)
MONOCYTES NFR BLD AUTO: 7.2 % (ref 5–12)
NEUTROPHILS # BLD AUTO: 3.86 10*3/MM3 (ref 1.7–7)
NEUTROPHILS NFR BLD AUTO: 57.6 % (ref 42.7–76)
NRBC BLD AUTO-RTO: 0 /100 WBC (ref 0–0.2)
PLATELET # BLD AUTO: 329 10*3/MM3 (ref 140–450)
POTASSIUM SERPL-SCNC: 4.3 MMOL/L (ref 3.5–5.2)
PROT SERPL-MCNC: 7.2 G/DL (ref 6–8.5)
RBC # BLD AUTO: 4.32 10*6/MM3 (ref 3.77–5.28)
SODIUM SERPL-SCNC: 145 MMOL/L (ref 136–145)
TRIGL SERPL-MCNC: 135 MG/DL (ref 0–150)
TSH SERPL DL<=0.005 MIU/L-ACNC: 2.2 UIU/ML (ref 0.27–4.2)
VLDLC SERPL CALC-MCNC: 24 MG/DL (ref 5–40)
WBC # BLD AUTO: 6.71 10*3/MM3 (ref 3.4–10.8)

## 2024-06-25 ENCOUNTER — PATIENT MESSAGE (OUTPATIENT)
Dept: INTERNAL MEDICINE | Facility: CLINIC | Age: 64
End: 2024-06-25
Payer: COMMERCIAL

## 2024-06-26 ENCOUNTER — TELEPHONE (OUTPATIENT)
Dept: INTERNAL MEDICINE | Facility: CLINIC | Age: 64
End: 2024-06-26
Payer: COMMERCIAL

## 2024-06-26 RX ORDER — LEVOCETIRIZINE DIHYDROCHLORIDE 5 MG/1
5 TABLET, FILM COATED ORAL EVERY EVENING
Qty: 90 TABLET | Refills: 1 | Status: SHIPPED | OUTPATIENT
Start: 2024-06-26 | End: 2024-06-27

## 2024-06-26 NOTE — TELEPHONE ENCOUNTER
----- Message from Celi Ceballos sent at 6/26/2024  2:30 PM EDT -----  Rosemarie,   Your cholesterol is elevated.  Are you taking the crestor nightly?  All other labs are okay.

## 2024-06-26 NOTE — TELEPHONE ENCOUNTER
From: Rosemarie Wright  To: Celi Ceballos  Sent: 6/25/2024 3:54 PM EDT  Subject: Medication for leg pain    Having to apply the topical ointment is too much especially while at work. Can u prescribe something for the pain?

## 2024-06-26 NOTE — TELEPHONE ENCOUNTER
"Relay     \"Your cholesterol is elevated.  Are you taking the crestor nightly?  All other labs are okay.\"                  "

## 2024-06-26 NOTE — TELEPHONE ENCOUNTER
From: Rosemarie Wright  To: Celi Ceballos  Sent: 6/25/2024 3:52 PM EDT  Subject: Seasonal allergies     Can you prescribe me something for seasonal allergies? The cost will be less if obtained by prescription vs otc. Thank you

## 2024-06-27 ENCOUNTER — PATIENT MESSAGE (OUTPATIENT)
Dept: INTERNAL MEDICINE | Facility: CLINIC | Age: 64
End: 2024-06-27
Payer: COMMERCIAL

## 2024-06-27 RX ORDER — LEVOCETIRIZINE DIHYDROCHLORIDE 5 MG/1
5 TABLET, FILM COATED ORAL EVERY EVENING
Qty: 90 TABLET | Refills: 1 | Status: SHIPPED | OUTPATIENT
Start: 2024-06-27 | End: 2024-06-28

## 2024-06-27 NOTE — TELEPHONE ENCOUNTER
Patient view results and notations.   Written by Celi Ceballos DO on 6/26/2024  2:30 PM EDT  Seen by patient Rosemarei Wright on 6/26/2024  5:30 PM

## 2024-06-27 NOTE — TELEPHONE ENCOUNTER
From: Rosemarie Wright  To: Celi Ceballos  Sent: 6/27/2024 9:36 AM EDT  Subject: Legitimizing 5 mg aka Yzal    Can u send this script to [facility] in [facility]. Ty

## 2024-06-28 RX ORDER — LEVOCETIRIZINE DIHYDROCHLORIDE 5 MG/1
5 TABLET, FILM COATED ORAL EVERY EVENING
Qty: 90 TABLET | Refills: 1 | Status: SHIPPED | OUTPATIENT
Start: 2024-06-28

## 2024-09-20 ENCOUNTER — OFFICE VISIT (OUTPATIENT)
Dept: INTERNAL MEDICINE | Facility: CLINIC | Age: 64
End: 2024-09-20
Payer: COMMERCIAL

## 2024-09-20 VITALS
DIASTOLIC BLOOD PRESSURE: 82 MMHG | HEART RATE: 70 BPM | HEIGHT: 64 IN | TEMPERATURE: 98 F | OXYGEN SATURATION: 98 % | SYSTOLIC BLOOD PRESSURE: 122 MMHG | BODY MASS INDEX: 28.51 KG/M2 | WEIGHT: 167 LBS

## 2024-09-20 DIAGNOSIS — K64.9 HEMORRHOIDS, UNSPECIFIED HEMORRHOID TYPE: Primary | ICD-10-CM

## 2024-09-20 DIAGNOSIS — E66.3 OVERWEIGHT (BMI 25.0-29.9): ICD-10-CM

## 2024-09-20 PROCEDURE — 99214 OFFICE O/P EST MOD 30 MIN: CPT | Performed by: FAMILY MEDICINE

## 2024-09-20 PROCEDURE — 90471 IMMUNIZATION ADMIN: CPT | Performed by: FAMILY MEDICINE

## 2024-09-20 PROCEDURE — 90656 IIV3 VACC NO PRSV 0.5 ML IM: CPT | Performed by: FAMILY MEDICINE

## 2024-09-20 RX ORDER — BUPROPION HYDROCHLORIDE 150 MG/1
150 TABLET ORAL DAILY
Qty: 90 TABLET | Refills: 1 | Status: SHIPPED | OUTPATIENT
Start: 2024-09-20

## 2024-09-20 RX ORDER — HYDROCORTISONE 25 MG/G
CREAM TOPICAL 2 TIMES DAILY
Qty: 28 G | Refills: 0 | Status: SHIPPED | OUTPATIENT
Start: 2024-09-20

## 2024-09-30 ENCOUNTER — OFFICE VISIT (OUTPATIENT)
Age: 64
End: 2024-09-30
Payer: COMMERCIAL

## 2024-09-30 VITALS
SYSTOLIC BLOOD PRESSURE: 102 MMHG | BODY MASS INDEX: 29.3 KG/M2 | TEMPERATURE: 96.9 F | HEART RATE: 107 BPM | OXYGEN SATURATION: 99 % | HEIGHT: 64 IN | DIASTOLIC BLOOD PRESSURE: 74 MMHG | WEIGHT: 171.6 LBS

## 2024-09-30 DIAGNOSIS — K52.9 GASTROENTERITIS: ICD-10-CM

## 2024-09-30 DIAGNOSIS — R11.0 NAUSEA: Primary | ICD-10-CM

## 2024-09-30 PROCEDURE — 99213 OFFICE O/P EST LOW 20 MIN: CPT | Performed by: FAMILY MEDICINE

## 2024-09-30 RX ORDER — ONDANSETRON 4 MG/1
4 TABLET, FILM COATED ORAL EVERY 8 HOURS PRN
Qty: 12 TABLET | Refills: 0 | Status: SHIPPED | OUTPATIENT
Start: 2024-09-30

## 2024-09-30 NOTE — PROGRESS NOTES
Follow Up Office Visit      Date: 2024   Patient Name: Rosemarie Wright  : 1960   MRN: [id number]     Chief Complaint:    Chief Complaint   Patient presents with    Diarrhea     Chills  Abdominal pain  Stomach cramping       History of Present Illness: Rosemarie Wright is a 64 y.o. female who is here today for abd pain, cramping, chills, diarrhea.  States that yesterday she was going to the bathroom every 15-20 minutes.States that continued throughout most of last night.  Does state that she has not had any bowel movements for about the last 3 to 4 hours.  Feels that her abdominal pain is also improved.    Subjective      Review of Systems:   Review of Systems   Constitutional:  Positive for chills. Negative for fever.   HENT:  Negative for congestion.    Eyes:  Negative for discharge.   Respiratory:  Negative for cough.    Cardiovascular:  Negative for chest pain.   Gastrointestinal:  Positive for abdominal pain, diarrhea and nausea.       I have reviewed the patients family history, social history, past medical history, past surgical history and have updated it as appropriate.     Medications:     Current Outpatient Medications:     buPROPion XL (Wellbutrin XL) 150 MG 24 hr tablet, Take 1 tablet by mouth Daily., Disp: 90 tablet, Rfl: 1    cyclobenzaprine (FLEXERIL) 5 MG tablet, Take 1 tablet by mouth 3 (Three) Times a Day As Needed for Muscle Spasms., Disp: 90 tablet, Rfl: 0    desvenlafaxine (PRISTIQ) 100 MG 24 hr tablet, Take 1 tablet by mouth Daily., Disp: 90 tablet, Rfl: 3    Hydrocortisone, Perianal, (ANUSOL-HC) 2.5 % rectal cream, Insert  into the rectum 2 (Two) Times a Day., Disp: 28 g, Rfl: 0    levocetirizine (XYZAL) 5 MG tablet, Take 1 tablet by mouth Every Evening., Disp: 90 tablet, Rfl: 1    Linzess 145 MCG capsule capsule, Take 1 capsule by mouth Every Morning Before Breakfast., Disp: , Rfl:     losartan-hydrochlorothiazide (HYZAAR) 50-12.5 MG per tablet, Take 1 tablet by mouth  "Daily., Disp: 90 tablet, Rfl: 3    pantoprazole (PROTONIX) 40 MG EC tablet, Take 1 tablet by mouth Daily., Disp: , Rfl:     propranolol LA (Inderal LA) 60 MG 24 hr capsule, Take 1 capsule by mouth Daily., Disp: 90 capsule, Rfl: 3    rosuvastatin (CRESTOR) 40 MG tablet, Take 1 tablet by mouth Every Night., Disp: 90 tablet, Rfl: 3    SUMAtriptan (IMITREX) 50 MG tablet, Take 1 tablet by mouth Every 2 (Two) Hours As Needed for Migraine. Take one tablet at onset of headache. May repeat dose one time in 2 hours if headache not relieved., Disp: 9 tablet, Rfl: 2    montelukast (SINGULAIR) 10 MG tablet, Take 1 tablet by mouth Every Night. (Patient not taking: Reported on 9/30/2024), Disp: , Rfl:     ondansetron (Zofran) 4 MG tablet, Take 1 tablet by mouth Every 8 (Eight) Hours As Needed for Nausea or Vomiting., Disp: 12 tablet, Rfl: 0    Tretinoin (Retinoic Acid) powder, , Disp: , Rfl:     Allergies:   No Known Allergies    Objective     Physical Exam: Please see above  Vital Signs:   Vitals:    09/30/24 1456   BP: 102/74   Pulse: 107   Temp: 96.9 °F (36.1 °C)   SpO2: 99%   Weight: 77.8 kg (171 lb 9.6 oz)   Height: 162.6 cm (64.02\")     Body mass index is 29.44 kg/m².    Physical Exam  Vitals and nursing note reviewed.   Constitutional:       Appearance: Normal appearance.   HENT:      Head: Normocephalic and atraumatic.   Eyes:      General: Lids are normal.      Conjunctiva/sclera: Conjunctivae normal.   Cardiovascular:      Rate and Rhythm: Normal rate and regular rhythm.   Pulmonary:      Effort: Pulmonary effort is normal.      Breath sounds: Normal breath sounds and air entry.   Abdominal:      General: Abdomen is flat. Bowel sounds are normal.      Palpations: Abdomen is soft.   Musculoskeletal:      Cervical back: Full passive range of motion without pain and normal range of motion.   Neurological:      General: No focal deficit present.      Mental Status: She is alert and oriented to person, place, and time. "   Psychiatric:         Attention and Perception: Attention normal.         Mood and Affect: Mood normal.         Behavior: Behavior normal. Behavior is cooperative.         Procedures    Results:   Labs:   TSH   Date Value Ref Range Status   06/20/2024 2.200 0.270 - 4.200 uIU/mL Final        POCT Results (if applicable):   Results for orders placed or performed in visit on 06/20/24   Comprehensive Metabolic Panel    Specimen: Blood   Result Value Ref Range    Glucose 93 65 - 99 mg/dL    BUN 12 8 - 23 mg/dL    Creatinine 1.02 (H) 0.57 - 1.00 mg/dL    EGFR Result 61.9 >60.0 mL/min/1.73    BUN/Creatinine Ratio 11.8 7.0 - 25.0    Sodium 145 136 - 145 mmol/L    Potassium 4.3 3.5 - 5.2 mmol/L    Chloride 105 98 - 107 mmol/L    Total CO2 28.1 22.0 - 29.0 mmol/L    Calcium 9.8 8.6 - 10.5 mg/dL    Total Protein 7.2 6.0 - 8.5 g/dL    Albumin 4.4 3.5 - 5.2 g/dL    Globulin 2.8 gm/dL    A/G Ratio 1.6 g/dL    Total Bilirubin 0.3 0.0 - 1.2 mg/dL    Alkaline Phosphatase 76 39 - 117 U/L    AST (SGOT) 18 1 - 32 U/L    ALT (SGPT) 9 1 - 33 U/L   Lipid Panel    Specimen: Blood   Result Value Ref Range    Total Cholesterol 257 (H) 0 - 200 mg/dL    Triglycerides 135 0 - 150 mg/dL    HDL Cholesterol 73 (H) 40 - 60 mg/dL    VLDL Cholesterol Maged 24 5 - 40 mg/dL    LDL Chol Calc (NIH) 160 (H) 0 - 100 mg/dL   TSH    Specimen: Blood   Result Value Ref Range    TSH 2.200 0.270 - 4.200 uIU/mL   CBC & Differential    Specimen: Blood   Result Value Ref Range    WBC 6.71 3.40 - 10.80 10*3/mm3    RBC 4.32 3.77 - 5.28 10*6/mm3    Hemoglobin 12.3 12.0 - 15.9 g/dL    Hematocrit 38.1 34.0 - 46.6 %    MCV 88.2 79.0 - 97.0 fL    MCH 28.5 26.6 - 33.0 pg    MCHC 32.3 31.5 - 35.7 g/dL    RDW 13.1 12.3 - 15.4 %    Platelets 329 140 - 450 10*3/mm3    Neutrophil Rel % 57.6 42.7 - 76.0 %    Lymphocyte Rel % 31.7 19.6 - 45.3 %    Monocyte Rel % 7.2 5.0 - 12.0 %    Eosinophil Rel % 1.9 0.3 - 6.2 %    Basophil Rel % 1.3 0.0 - 1.5 %    Neutrophils Absolute 3.86 1.70  - 7.00 10*3/mm3    Lymphocytes Absolute 2.13 0.70 - 3.10 10*3/mm3    Monocytes Absolute 0.48 0.10 - 0.90 10*3/mm3    Eosinophils Absolute 0.13 0.00 - 0.40 10*3/mm3    Basophils Absolute 0.09 0.00 - 0.20 10*3/mm3    Immature Granulocyte Rel % 0.3 0.0 - 0.5 %    Immature Grans Absolute 0.02 0.00 - 0.05 10*3/mm3    nRBC 0.0 0.0 - 0.2 /100 WBC       Imaging:   No valid procedures specified.         Assessment / Plan      Assessment/Plan:   Diagnoses and all orders for this visit:    1. Nausea (Primary)  -     ondansetron (Zofran) 4 MG tablet; Take 1 tablet by mouth Every 8 (Eight) Hours As Needed for Nausea or Vomiting.  Dispense: 12 tablet; Refill: 0    2. Gastroenteritis   - Most likely viral in nature.  Given the fact that the bowel movements have stopped over the last 3 to 4 hours we will monitor and treat symptomatically at present time.               Vaccine Counseling:      Follow Up:   No follow-ups on file.      Mayito Orellana, DO   [facility] [place] [place] [facility]

## 2024-10-28 RX ORDER — PROPRANOLOL HYDROCHLORIDE 60 MG/1
60 CAPSULE, EXTENDED RELEASE ORAL DAILY
Qty: 90 CAPSULE | Refills: 0 | Status: SHIPPED | OUTPATIENT
Start: 2024-10-28

## 2024-11-21 RX ORDER — ROSUVASTATIN CALCIUM 40 MG/1
40 TABLET, COATED ORAL NIGHTLY
Qty: 30 TABLET | Refills: 0 | Status: SHIPPED | OUTPATIENT
Start: 2024-11-21 | End: 2024-11-25

## 2024-11-21 RX ORDER — BUPROPION HYDROCHLORIDE 150 MG/1
150 TABLET ORAL DAILY
Qty: 30 TABLET | Refills: 0 | Status: SHIPPED | OUTPATIENT
Start: 2024-11-21 | End: 2024-11-25

## 2024-11-21 RX ORDER — LOSARTAN POTASSIUM AND HYDROCHLOROTHIAZIDE 12.5; 5 MG/1; MG/1
TABLET ORAL
Qty: 30 TABLET | Refills: 0 | Status: SHIPPED | OUTPATIENT
Start: 2024-11-21 | End: 2024-11-25

## 2024-11-25 RX ORDER — BUPROPION HYDROCHLORIDE 150 MG/1
TABLET ORAL
Qty: 90 TABLET | Refills: 2 | Status: SHIPPED | OUTPATIENT
Start: 2024-11-25

## 2024-11-25 RX ORDER — LOSARTAN POTASSIUM AND HYDROCHLOROTHIAZIDE 12.5; 5 MG/1; MG/1
TABLET ORAL
Qty: 90 TABLET | Refills: 2 | Status: SHIPPED | OUTPATIENT
Start: 2024-11-25

## 2024-11-25 RX ORDER — ROSUVASTATIN CALCIUM 40 MG/1
40 TABLET, COATED ORAL
Qty: 90 TABLET | Refills: 2 | Status: SHIPPED | OUTPATIENT
Start: 2024-11-25

## 2024-11-26 ENCOUNTER — OFFICE VISIT (OUTPATIENT)
Dept: INTERNAL MEDICINE | Facility: CLINIC | Age: 64
End: 2024-11-26
Payer: COMMERCIAL

## 2024-11-26 VITALS
WEIGHT: 180 LBS | OXYGEN SATURATION: 98 % | BODY MASS INDEX: 30.73 KG/M2 | DIASTOLIC BLOOD PRESSURE: 82 MMHG | SYSTOLIC BLOOD PRESSURE: 122 MMHG | TEMPERATURE: 98 F | HEART RATE: 86 BPM | HEIGHT: 64 IN

## 2024-11-26 DIAGNOSIS — R09.81 NASAL CONGESTION: ICD-10-CM

## 2024-11-26 DIAGNOSIS — J02.9 SORE THROAT: ICD-10-CM

## 2024-11-26 DIAGNOSIS — J01.00 ACUTE NON-RECURRENT MAXILLARY SINUSITIS: Primary | ICD-10-CM

## 2024-11-26 LAB
EXPIRATION DATE: NORMAL
EXPIRATION DATE: NORMAL
FLUAV AG UPPER RESP QL IA.RAPID: NOT DETECTED
FLUBV AG UPPER RESP QL IA.RAPID: NOT DETECTED
INTERNAL CONTROL: NORMAL
INTERNAL CONTROL: NORMAL
Lab: NORMAL
Lab: NORMAL
S PYO AG THROAT QL: NEGATIVE
SARS-COV-2 AG UPPER RESP QL IA.RAPID: NOT DETECTED

## 2024-11-26 PROCEDURE — 87880 STREP A ASSAY W/OPTIC: CPT | Performed by: FAMILY MEDICINE

## 2024-11-26 PROCEDURE — 87428 SARSCOV & INF VIR A&B AG IA: CPT | Performed by: FAMILY MEDICINE

## 2024-11-26 PROCEDURE — 99213 OFFICE O/P EST LOW 20 MIN: CPT | Performed by: FAMILY MEDICINE

## 2024-11-26 RX ORDER — FLUCONAZOLE 150 MG/1
150 TABLET ORAL
Qty: 2 TABLET | Refills: 0 | Status: SHIPPED | OUTPATIENT
Start: 2024-11-26

## 2024-11-26 RX ORDER — LORATADINE 10 MG/1
10 TABLET ORAL DAILY
COMMUNITY

## 2024-11-26 RX ORDER — BENZONATATE 200 MG/1
200 CAPSULE ORAL 3 TIMES DAILY PRN
Qty: 30 CAPSULE | Refills: 0 | Status: SHIPPED | OUTPATIENT
Start: 2024-11-26

## 2024-11-26 NOTE — PROGRESS NOTES
Rosemarie Wright is a 64 y.o. female.    Chief Complaint   Patient presents with    Headache     On going for about 2 weeks, light but hurting all over her head.     Earache     Bilateral ear pain.     Sore Throat     Hoarse voice.     Nasal Congestion       HPI   History of Present Illness  The patient presents today with upper respiratory symptoms.    She has been experiencing symptoms for approximately 3 weeks, including significant nasal congestion, a frontal headache, postnasal drip, ear pain, sore throat, and shortness of breath. She has not taken any over-the-counter medications and reports no fever. Despite taking Claritin, her symptoms persist.     She has no known allergies to antibiotics. She has Flonase nasal spray at home but has not been using it regularly. She has not tried Tessalon Perles for her cough. She has a history of developing yeast infections when taking Augmentin, which typically requires treatment with 2 Diflucan tablets.    The following portions of the patient's history were reviewed and updated as appropriate: allergies, current medications, past family history, past medical history, past social history, past surgical history and problem list.     No Known Allergies      Current Outpatient Medications:     buPROPion XL (WELLBUTRIN XL) 150 MG 24 hr tablet, Take 1 tablet BY MOUTH EVERY DAY for mood, Disp: 90 tablet, Rfl: 2    cyclobenzaprine (FLEXERIL) 5 MG tablet, Take 1 tablet by mouth 3 (Three) Times a Day As Needed for Muscle Spasms., Disp: 90 tablet, Rfl: 0    desvenlafaxine (PRISTIQ) 100 MG 24 hr tablet, Take 1 tablet by mouth Daily., Disp: 90 tablet, Rfl: 3    Hydrocortisone, Perianal, (ANUSOL-HC) 2.5 % rectal cream, Insert  into the rectum 2 (Two) Times a Day., Disp: 28 g, Rfl: 0    Linzess 145 MCG capsule capsule, Take 1 capsule by mouth Every Morning Before Breakfast., Disp: , Rfl:     losartan-hydrochlorothiazide (HYZAAR) 50-12.5 MG per tablet, Take 1 tablet BY MOUTH EVERY  "DAY FOR BLOOD PRESSURE and Fluid retention, Disp: 90 tablet, Rfl: 2    ondansetron (Zofran) 4 MG tablet, Take 1 tablet by mouth Every 8 (Eight) Hours As Needed for Nausea or Vomiting., Disp: 12 tablet, Rfl: 0    pantoprazole (PROTONIX) 40 MG EC tablet, Take 1 tablet by mouth Daily., Disp: , Rfl:     propranolol LA (INDERAL LA) 60 MG 24 hr capsule, TAKE ONE CAPSULE BY MOUTH EVERY DAY FOR BLOOD PRESSURE, Disp: 90 capsule, Rfl: 0    rosuvastatin (CRESTOR) 40 MG tablet, Take 1 tablet BY MOUTH EVERY NIGHT AT BEDTIME FOR CHOLESTEROL, Disp: 90 tablet, Rfl: 2    SUMAtriptan (IMITREX) 50 MG tablet, Take 1 tablet by mouth Every 2 (Two) Hours As Needed for Migraine. Take one tablet at onset of headache. May repeat dose one time in 2 hours if headache not relieved., Disp: 9 tablet, Rfl: 2    Tretinoin (Retinoic Acid) powder, , Disp: , Rfl:     amoxicillin-clavulanate (AUGMENTIN) 875-125 MG per tablet, Take 1 tablet by mouth 2 (Two) Times a Day., Disp: 20 tablet, Rfl: 0    benzonatate (TESSALON) 200 MG capsule, Take 1 capsule by mouth 3 (Three) Times a Day As Needed for Cough., Disp: 30 capsule, Rfl: 0    fluconazole (Diflucan) 150 MG tablet, Take 1 tablet by mouth Every 3 (Three) Days., Disp: 2 tablet, Rfl: 0    loratadine (Claritin) 10 MG tablet, Take 1 tablet by mouth Daily., Disp: , Rfl:     ROS    Review of Systems    Vitals:    11/26/24 1141   BP: 122/82   Pulse: 86   Temp: 98 °F (36.7 °C)   SpO2: 98%   Weight: 81.6 kg (180 lb)   Height: 162.6 cm (64.02\")         Physical Exam     Physical Exam  Constitutional:       General: She is not in acute distress.     Appearance: Normal appearance. She is well-developed.   HENT:      Head: Normocephalic and atraumatic.      Right Ear: Tympanic membrane and external ear normal.      Left Ear: Tympanic membrane and external ear normal.      Nose:      Right Sinus: Maxillary sinus tenderness present.      Left Sinus: Maxillary sinus tenderness present.      Mouth/Throat:      Pharynx: " Posterior oropharyngeal erythema (PND) present.   Eyes:      Extraocular Movements: Extraocular movements intact.      Conjunctiva/sclera: Conjunctivae normal.   Cardiovascular:      Rate and Rhythm: Normal rate and regular rhythm.      Heart sounds: No murmur heard.  Pulmonary:      Effort: Pulmonary effort is normal. No respiratory distress.      Breath sounds: Normal breath sounds. No wheezing.   Abdominal:      General: Bowel sounds are normal. There is no distension.      Palpations: Abdomen is soft.      Tenderness: There is no abdominal tenderness.   Musculoskeletal:      Cervical back: Neck supple.      Right lower leg: No edema.      Left lower leg: No edema.   Lymphadenopathy:      Cervical: No cervical adenopathy.   Skin:     General: Skin is warm and dry.   Neurological:      Mental Status: She is alert and oriented to person, place, and time.      Cranial Nerves: No cranial nerve deficit.   Psychiatric:         Mood and Affect: Mood normal.         Behavior: Behavior normal.             Diagnoses and all orders for this visit:    1. Acute non-recurrent maxillary sinusitis (Primary)    2. Sore throat  -     POCT SARS-CoV-2 Antigen LORENZO + Flu  -     POCT rapid strep A    3. Nasal congestion  -     POCT SARS-CoV-2 Antigen LORENZO + Flu  -     POCT rapid strep A    Other orders  -     amoxicillin-clavulanate (AUGMENTIN) 875-125 MG per tablet; Take 1 tablet by mouth 2 (Two) Times a Day.  Dispense: 20 tablet; Refill: 0  -     benzonatate (TESSALON) 200 MG capsule; Take 1 capsule by mouth 3 (Three) Times a Day As Needed for Cough.  Dispense: 30 capsule; Refill: 0  -     fluconazole (Diflucan) 150 MG tablet; Take 1 tablet by mouth Every 3 (Three) Days.  Dispense: 2 tablet; Refill: 0        Assessment & Plan  1. Sinusitis.  . She will be treated with Augmentin. The use of Flonase 1 to 2 times a day was encouraged. Tessalon Perles were recommended as needed for cough. She is advised to take a probiotic over the  counter to prevent stomach upset from the antibiotics.          New Medications Ordered This Visit   Medications    amoxicillin-clavulanate (AUGMENTIN) 875-125 MG per tablet     Sig: Take 1 tablet by mouth 2 (Two) Times a Day.     Dispense:  20 tablet     Refill:  0    benzonatate (TESSALON) 200 MG capsule     Sig: Take 1 capsule by mouth 3 (Three) Times a Day As Needed for Cough.     Dispense:  30 capsule     Refill:  0    fluconazole (Diflucan) 150 MG tablet     Sig: Take 1 tablet by mouth Every 3 (Three) Days.     Dispense:  2 tablet     Refill:  0       No orders of the defined types were placed in this encounter.      Return if symptoms worsen or fail to improve.    Celi Ceballos, DO

## 2024-12-20 ENCOUNTER — OFFICE VISIT (OUTPATIENT)
Dept: INTERNAL MEDICINE | Facility: CLINIC | Age: 64
End: 2024-12-20
Payer: COMMERCIAL

## 2024-12-20 VITALS
WEIGHT: 180 LBS | SYSTOLIC BLOOD PRESSURE: 122 MMHG | HEIGHT: 64 IN | TEMPERATURE: 98 F | DIASTOLIC BLOOD PRESSURE: 82 MMHG | BODY MASS INDEX: 30.73 KG/M2 | HEART RATE: 78 BPM | OXYGEN SATURATION: 98 %

## 2024-12-20 DIAGNOSIS — I10 PRIMARY HYPERTENSION: Primary | ICD-10-CM

## 2024-12-20 DIAGNOSIS — E78.5 HYPERLIPIDEMIA, UNSPECIFIED HYPERLIPIDEMIA TYPE: ICD-10-CM

## 2024-12-20 DIAGNOSIS — G43.009 MIGRAINE WITHOUT AURA AND WITHOUT STATUS MIGRAINOSUS, NOT INTRACTABLE: ICD-10-CM

## 2024-12-20 DIAGNOSIS — E66.9 OBESITY WITH SERIOUS COMORBIDITY, UNSPECIFIED CLASS, UNSPECIFIED OBESITY TYPE: ICD-10-CM

## 2024-12-20 PROCEDURE — 99214 OFFICE O/P EST MOD 30 MIN: CPT | Performed by: FAMILY MEDICINE

## 2024-12-20 NOTE — ASSESSMENT & PLAN NOTE
Stable with propranolol for prevention.  Will discontinue Imitrex and start Nurtec instead for quick relief.  She has previously tried Maxalt as well.  Paperwork has been completed for FMLA.

## 2024-12-20 NOTE — PROGRESS NOTES
Rosemarie Wright is a 64 y.o. female.    Chief Complaint   Patient presents with    Hyperlipidemia    Hypertension    Migraine     Is due to re certify her FMLA for her migraines.     Weight Loss     Would like to get a referral to a dietician.         MAURICIO Christy has hypertension.  She is taking losartan-hydrochlorothiazide for this issue with good response.  Blood pressure is controlled in office today.  She does not consume much salt.    She also has hyperlipidemia.  She has been compliant with taking Crestor.  Denies any side effects to the medication.  Tries to follow a low-fat diet.    In addition, she does suffer from migraines.  She reports migraines approximately 3 times a year, which does interfere with her ability to work.  She is requesting FMLA for migraines.  She is on propranolol primarily for prevention and takes Imitrex as needed.  However, Imitrex does not always work and tends to cause drowsiness.  She had tried a friend's Nurtec with good response, which did not cause drowsiness.  She is interested in receiving a prescription for Nurtec instead of Imitrex.  She has previously tried Maxalt as well.    She does have concern with increased weight gain.  She is interested in seeing a nutritionist or dietitian to help manage a healthy diet.    The following portions of the patient's history were reviewed and updated as appropriate: allergies, current medications, past family history, past medical history, past social history, past surgical history and problem list.     Allergies   Allergen Reactions    Wellbutrin [Bupropion] Mental Status Change         Current Outpatient Medications:     cyclobenzaprine (FLEXERIL) 5 MG tablet, Take 1 tablet by mouth 3 (Three) Times a Day As Needed for Muscle Spasms., Disp: 90 tablet, Rfl: 0    desvenlafaxine (PRISTIQ) 100 MG 24 hr tablet, Take 1 tablet by mouth Daily., Disp: 90 tablet, Rfl: 3    Hydrocortisone, Perianal, (ANUSOL-HC) 2.5 % rectal cream, Insert   "into the rectum 2 (Two) Times a Day., Disp: 28 g, Rfl: 0    Linzess 145 MCG capsule capsule, Take 1 capsule by mouth Every Morning Before Breakfast., Disp: , Rfl:     loratadine (Claritin) 10 MG tablet, Take 1 tablet by mouth Daily., Disp: , Rfl:     losartan-hydrochlorothiazide (HYZAAR) 50-12.5 MG per tablet, Take 1 tablet BY MOUTH EVERY DAY FOR BLOOD PRESSURE and Fluid retention, Disp: 90 tablet, Rfl: 2    ondansetron (Zofran) 4 MG tablet, Take 1 tablet by mouth Every 8 (Eight) Hours As Needed for Nausea or Vomiting., Disp: 12 tablet, Rfl: 0    pantoprazole (PROTONIX) 40 MG EC tablet, Take 1 tablet by mouth Daily., Disp: , Rfl:     propranolol LA (INDERAL LA) 60 MG 24 hr capsule, TAKE ONE CAPSULE BY MOUTH EVERY DAY FOR BLOOD PRESSURE, Disp: 90 capsule, Rfl: 0    rosuvastatin (CRESTOR) 40 MG tablet, Take 1 tablet BY MOUTH EVERY NIGHT AT BEDTIME FOR CHOLESTEROL, Disp: 90 tablet, Rfl: 2    Tretinoin (Retinoic Acid) powder, , Disp: , Rfl:     fluconazole (Diflucan) 150 MG tablet, Take 1 tablet by mouth Every 3 (Three) Days. (Patient not taking: Reported on 12/20/2024), Disp: 2 tablet, Rfl: 0    rimegepant sulfate (NURTEC) 75 MG tablet, Take 1 tablet by mouth 1 (One) Time As Needed (migraines) for up to 1 dose., Disp: 10 tablet, Rfl: 0    ROS    Review of Systems   Constitutional:  Negative for chills, diaphoresis, fatigue and fever.   HENT:  Positive for congestion. Negative for sore throat and swollen glands.    Respiratory:  Negative for cough.    Cardiovascular:  Negative for chest pain.   Gastrointestinal:  Positive for nausea. Negative for constipation, diarrhea and vomiting.   Genitourinary:  Negative for dysuria.   Musculoskeletal:  Negative for myalgias and neck pain.   Skin:  Negative for rash.   Neurological:  Negative for weakness and numbness.       Vitals:    12/20/24 0844   BP: 122/82   Pulse: 78   Temp: 98 °F (36.7 °C)   SpO2: 98%   Weight: 81.6 kg (180 lb)   Height: 162.6 cm (64.02\")   PainSc: 0-No " pain         Physical Exam     Physical Exam  Constitutional:       General: She is not in acute distress.     Appearance: Normal appearance. She is well-developed.   HENT:      Head: Normocephalic and atraumatic.      Right Ear: Tympanic membrane and external ear normal.      Left Ear: Tympanic membrane and external ear normal.      Mouth/Throat:      Pharynx: No posterior oropharyngeal erythema.   Eyes:      Extraocular Movements: Extraocular movements intact.      Conjunctiva/sclera: Conjunctivae normal.   Cardiovascular:      Rate and Rhythm: Normal rate and regular rhythm.      Heart sounds: No murmur heard.  Pulmonary:      Effort: Pulmonary effort is normal. No respiratory distress.      Breath sounds: Normal breath sounds. No wheezing.   Abdominal:      General: Bowel sounds are normal. There is no distension.      Palpations: Abdomen is soft.      Tenderness: There is no abdominal tenderness.   Musculoskeletal:      Right lower leg: No edema.      Left lower leg: No edema.   Skin:     General: Skin is warm and dry.   Neurological:      Mental Status: She is alert and oriented to person, place, and time.      Cranial Nerves: No cranial nerve deficit.   Psychiatric:         Mood and Affect: Mood normal.         Behavior: Behavior normal.         Assessment/Plan    Diagnoses and all orders for this visit:    1. Primary hypertension (Primary)  Assessment & Plan:  Hypertension is controlled on current medications. She will continue losartan-hydrochlorothiazide.    Orders:  -     CBC & Differential  -     Comprehensive Metabolic Panel    2. Hyperlipidemia, unspecified hyperlipidemia type  Assessment & Plan:  Stable per recent labs. We will continue Crestor and monitor her lipid panel once yearly.    Orders:  -     Lipid Panel    3. Migraine without aura and without status migrainosus, not intractable  Assessment & Plan:  Stable with propranolol for prevention.  Will discontinue Imitrex and start Nurtec instead  for quick relief.  She has previously tried Maxalt as well.  Paperwork has been completed for FMLA.            4. Obesity with serious comorbidity, unspecified class, unspecified obesity type  Assessment & Plan:  Patient's (Body mass index is 30.88 kg/m².) indicates that they are obese (BMI >30) with health conditions that include hypertension and dyslipidemias . Weight is unchanged. BMI  is above average; BMI management plan is completed. We discussed low calorie, low carb based diet program, increasing exercise, and referral to nutritionist has been made .     Orders:  -     Ambulatory Referral to Nutrition Services    Other orders  -     rimegepant sulfate (NURTEC) 75 MG tablet; Take 1 tablet by mouth 1 (One) Time As Needed (migraines) for up to 1 dose.  Dispense: 10 tablet; Refill: 0        New Medications Ordered This Visit   Medications    rimegepant sulfate (NURTEC) 75 MG tablet     Sig: Take 1 tablet by mouth 1 (One) Time As Needed (migraines) for up to 1 dose.     Dispense:  10 tablet     Refill:  0       No orders of the defined types were placed in this encounter.      Return in about 6 months (around 6/20/2025) for Annual.    Celi Ceballos DO

## 2024-12-20 NOTE — ASSESSMENT & PLAN NOTE
Patient's (Body mass index is 30.88 kg/m².) indicates that they are obese (BMI >30) with health conditions that include hypertension and dyslipidemias . Weight is unchanged. BMI  is above average; BMI management plan is completed. We discussed low calorie, low carb based diet program, increasing exercise, and referral to nutritionist has been made .

## 2024-12-21 LAB
ALBUMIN SERPL-MCNC: 4.5 G/DL (ref 3.5–5.2)
ALBUMIN/GLOB SERPL: 1.3 G/DL
ALP SERPL-CCNC: 77 U/L (ref 39–117)
ALT SERPL-CCNC: 14 U/L (ref 1–33)
AST SERPL-CCNC: 20 U/L (ref 1–32)
BASOPHILS # BLD AUTO: 0.08 10*3/MM3 (ref 0–0.2)
BASOPHILS NFR BLD AUTO: 1.1 % (ref 0–1.5)
BILIRUB SERPL-MCNC: 0.4 MG/DL (ref 0–1.2)
BUN SERPL-MCNC: 19 MG/DL (ref 8–23)
BUN/CREAT SERPL: 18.1 (ref 7–25)
CALCIUM SERPL-MCNC: 10.1 MG/DL (ref 8.6–10.5)
CHLORIDE SERPL-SCNC: 100 MMOL/L (ref 98–107)
CHOLEST SERPL-MCNC: 178 MG/DL (ref 0–200)
CO2 SERPL-SCNC: 29.6 MMOL/L (ref 22–29)
CREAT SERPL-MCNC: 1.05 MG/DL (ref 0.57–1)
EGFRCR SERPLBLD CKD-EPI 2021: 59.8 ML/MIN/1.73
EOSINOPHIL # BLD AUTO: 0.12 10*3/MM3 (ref 0–0.4)
EOSINOPHIL NFR BLD AUTO: 1.7 % (ref 0.3–6.2)
ERYTHROCYTE [DISTWIDTH] IN BLOOD BY AUTOMATED COUNT: 13.1 % (ref 12.3–15.4)
GLOBULIN SER CALC-MCNC: 3.5 GM/DL
GLUCOSE SERPL-MCNC: 91 MG/DL (ref 65–99)
HCT VFR BLD AUTO: 39 % (ref 34–46.6)
HDLC SERPL-MCNC: 66 MG/DL (ref 40–60)
HGB BLD-MCNC: 12.7 G/DL (ref 12–15.9)
IMM GRANULOCYTES # BLD AUTO: 0.02 10*3/MM3 (ref 0–0.05)
IMM GRANULOCYTES NFR BLD AUTO: 0.3 % (ref 0–0.5)
LDLC SERPL CALC-MCNC: 89 MG/DL (ref 0–100)
LYMPHOCYTES # BLD AUTO: 1.97 10*3/MM3 (ref 0.7–3.1)
LYMPHOCYTES NFR BLD AUTO: 27.8 % (ref 19.6–45.3)
MCH RBC QN AUTO: 29.2 PG (ref 26.6–33)
MCHC RBC AUTO-ENTMCNC: 32.6 G/DL (ref 31.5–35.7)
MCV RBC AUTO: 89.7 FL (ref 79–97)
MONOCYTES # BLD AUTO: 0.46 10*3/MM3 (ref 0.1–0.9)
MONOCYTES NFR BLD AUTO: 6.5 % (ref 5–12)
NEUTROPHILS # BLD AUTO: 4.44 10*3/MM3 (ref 1.7–7)
NEUTROPHILS NFR BLD AUTO: 62.6 % (ref 42.7–76)
NRBC BLD AUTO-RTO: 0 /100 WBC (ref 0–0.2)
PLATELET # BLD AUTO: 327 10*3/MM3 (ref 140–450)
POTASSIUM SERPL-SCNC: 4.5 MMOL/L (ref 3.5–5.2)
PROT SERPL-MCNC: 8 G/DL (ref 6–8.5)
RBC # BLD AUTO: 4.35 10*6/MM3 (ref 3.77–5.28)
SODIUM SERPL-SCNC: 139 MMOL/L (ref 136–145)
TRIGL SERPL-MCNC: 130 MG/DL (ref 0–150)
VLDLC SERPL CALC-MCNC: 23 MG/DL (ref 5–40)
WBC # BLD AUTO: 7.09 10*3/MM3 (ref 3.4–10.8)

## 2024-12-27 ENCOUNTER — PRIOR AUTHORIZATION (OUTPATIENT)
Dept: INTERNAL MEDICINE | Facility: CLINIC | Age: 64
End: 2024-12-27
Payer: COMMERCIAL

## 2024-12-27 NOTE — TELEPHONE ENCOUNTER
BOOKER MERRITT (Key: [id number])  PA Case ID #: [id number]  Rx #: [id number]    Status  Sent to Plan today

## 2025-01-04 RX ORDER — PROPRANOLOL HYDROCHLORIDE 60 MG/1
CAPSULE, EXTENDED RELEASE ORAL
Qty: 90 CAPSULE | Refills: 0 | Status: SHIPPED | OUTPATIENT
Start: 2025-01-04

## 2025-01-15 RX ORDER — LORATADINE 10 MG/1
10 TABLET ORAL DAILY
Qty: 90 TABLET | Refills: 2 | Status: SHIPPED | OUTPATIENT
Start: 2025-01-15

## 2025-01-15 RX ORDER — RIMEGEPANT SULFATE 75 MG/75MG
TABLET, ORALLY DISINTEGRATING ORAL
Qty: 10 TABLET | Refills: 0 | Status: SHIPPED | OUTPATIENT
Start: 2025-01-15

## 2025-02-07 RX ORDER — LORATADINE 10 MG/1
10 TABLET ORAL DAILY
Qty: 90 TABLET | Refills: 2 | OUTPATIENT
Start: 2025-02-07

## 2025-02-07 RX ORDER — CYCLOBENZAPRINE HCL 5 MG
5 TABLET ORAL 3 TIMES DAILY PRN
Qty: 90 TABLET | Refills: 0 | Status: SHIPPED | OUTPATIENT
Start: 2025-02-07

## 2025-02-07 RX ORDER — PROPRANOLOL HYDROCHLORIDE 60 MG/1
CAPSULE, EXTENDED RELEASE ORAL
Qty: 90 CAPSULE | Refills: 0 | OUTPATIENT
Start: 2025-02-07

## 2025-02-07 NOTE — TELEPHONE ENCOUNTER
Rx Refill Note  Requested Prescriptions     Pending Prescriptions Disp Refills    cyclobenzaprine (FLEXERIL) 5 MG tablet 90 tablet 0     Sig: Take 1 tablet by mouth 3 (Three) Times a Day As Needed for Muscle Spasms.     Refused Prescriptions Disp Refills    propranolol LA (INDERAL LA) 60 MG 24 hr capsule 90 capsule 0     Refused By: RIKY LOBO     Reason for Refusal: Patient has requested refill too soon    rimegepant sulfate ODT (Nurtec) 75 MG tablet 10 tablet 0     Refused By: RIKY LOBO     Reason for Refusal: Patient has requested refill too soon    loratadine (Claritin) 10 MG tablet 90 tablet 2     Sig: Take 1 tablet by mouth Daily.     Refused By: RIKY LOBO     Reason for Refusal: Patient has requested refill too soon      Last office visit with prescribing clinician: 12/20/2024   Last telemedicine visit with prescribing clinician: Visit date not found   Next office visit with prescribing clinician: 6/24/2025                         Would you like a call back once the refill request has been completed: [] Yes [] No    If the office needs to give you a call back, can they leave a voicemail: [] Yes [] No    Riky Lobo CMA  02/07/25, 14:47 EST

## 2025-02-10 ENCOUNTER — PATIENT MESSAGE (OUTPATIENT)
Dept: INTERNAL MEDICINE | Facility: CLINIC | Age: 65
End: 2025-02-10
Payer: COMMERCIAL

## 2025-02-10 ENCOUNTER — TELEPHONE (OUTPATIENT)
Dept: OBSTETRICS AND GYNECOLOGY | Facility: CLINIC | Age: 65
End: 2025-02-10

## 2025-02-10 RX ORDER — CYCLOBENZAPRINE HCL 5 MG
5 TABLET ORAL 3 TIMES DAILY PRN
Qty: 90 TABLET | Refills: 0 | Status: CANCELLED | OUTPATIENT
Start: 2025-02-10

## 2025-02-10 NOTE — TELEPHONE ENCOUNTER
I do not see that you have prescribed a nasal spray for the patient in the past. Okay to send Flonase? Or have patient purchase OTC?

## 2025-02-10 NOTE — TELEPHONE ENCOUNTER
Caller: BOOKER MERRITT    [facility] call back number: [phone]    PATIENT CALLED REQUESTING TO CANCEL SAME DAY APPT.    Did the patient call AFTER the start time of their scheduled appointment?  []YES  [x]NO    Was the patient's appointment rescheduled? [x]YES  []NO      
No

## 2025-02-11 RX ORDER — FLUTICASONE PROPIONATE 50 MCG
2 SPRAY, SUSPENSION (ML) NASAL DAILY
Qty: 18.2 G | Refills: 5 | Status: SHIPPED | OUTPATIENT
Start: 2025-02-11

## 2025-05-22 RX ORDER — PROPRANOLOL HYDROCHLORIDE 60 MG/1
CAPSULE, EXTENDED RELEASE ORAL
Qty: 90 CAPSULE | Refills: 3 | Status: SHIPPED | OUTPATIENT
Start: 2025-05-22

## 2025-06-23 RX ORDER — DESVENLAFAXINE 100 MG/1
100 TABLET, EXTENDED RELEASE ORAL DAILY
Qty: 90 TABLET | Refills: 0 | Status: SHIPPED | OUTPATIENT
Start: 2025-06-23

## 2025-06-26 RX ORDER — LORATADINE 10 MG/1
10 TABLET ORAL DAILY
Qty: 90 TABLET | Refills: 2 | Status: SHIPPED | OUTPATIENT
Start: 2025-06-26

## 2025-06-26 RX ORDER — HYDROCORTISONE 25 MG/G
CREAM TOPICAL 2 TIMES DAILY
Qty: 28 G | Refills: 0 | Status: SHIPPED | OUTPATIENT
Start: 2025-06-26

## 2025-06-26 RX ORDER — PROPRANOLOL HYDROCHLORIDE 60 MG/1
CAPSULE, EXTENDED RELEASE ORAL
Qty: 90 CAPSULE | Refills: 3 | Status: CANCELLED | OUTPATIENT
Start: 2025-06-26

## 2025-06-26 RX ORDER — CYCLOBENZAPRINE HCL 5 MG
5 TABLET ORAL 3 TIMES DAILY PRN
Qty: 90 TABLET | Refills: 0 | Status: SHIPPED | OUTPATIENT
Start: 2025-06-26

## 2025-06-26 RX ORDER — LOSARTAN POTASSIUM AND HYDROCHLOROTHIAZIDE 12.5; 5 MG/1; MG/1
1 TABLET ORAL DAILY
Qty: 90 TABLET | Refills: 2 | Status: SHIPPED | OUTPATIENT
Start: 2025-06-26

## 2025-06-26 NOTE — TELEPHONE ENCOUNTER
Rx Refill Note  Requested Prescriptions     Pending Prescriptions Disp Refills    Hydrocortisone, Perianal, (ANUSOL-HC) 2.5 % rectal cream 28 g 0     Sig: Insert  into the rectum 2 (Two) Times a Day.    losartan-hydrochlorothiazide (HYZAAR) 50-12.5 MG per tablet 90 tablet 2    rimegepant sulfate ODT (Nurtec) 75 MG disintegrating tablet 10 tablet 0    loratadine (Claritin) 10 MG tablet 90 tablet 2     Sig: Take 1 tablet by mouth Daily.    cyclobenzaprine (FLEXERIL) 5 MG tablet 90 tablet 0     Sig: Take 1 tablet by mouth 3 (Three) Times a Day As Needed for Muscle Spasms.    propranolol LA (INDERAL LA) 60 MG 24 hr capsule 90 capsule 3      Last office visit with prescribing clinician: 12/20/2024   Last telemedicine visit with prescribing clinician: Visit date not found   Next office visit with prescribing clinician: 8/13/2025                         Would you like a call back once the refill request has been completed: [] Yes [] No    If the office needs to give you a call back, can they leave a voicemail: [] Yes [] No    Washington Mccurdy MA  06/26/25, 15:57 EDT

## 2025-07-01 DIAGNOSIS — R11.0 NAUSEA: ICD-10-CM

## 2025-07-01 RX ORDER — DESVENLAFAXINE 100 MG/1
100 TABLET, EXTENDED RELEASE ORAL DAILY
Qty: 90 TABLET | Refills: 2 | Status: SHIPPED | OUTPATIENT
Start: 2025-07-01

## 2025-07-01 RX ORDER — CYCLOBENZAPRINE HCL 5 MG
5 TABLET ORAL 3 TIMES DAILY PRN
Qty: 90 TABLET | Refills: 0 | Status: SHIPPED | OUTPATIENT
Start: 2025-07-01

## 2025-07-01 RX ORDER — FLUTICASONE PROPIONATE 50 MCG
2 SPRAY, SUSPENSION (ML) NASAL DAILY
Qty: 18.2 G | Refills: 5 | Status: SHIPPED | OUTPATIENT
Start: 2025-07-01

## 2025-07-01 RX ORDER — ROSUVASTATIN CALCIUM 40 MG/1
40 TABLET, COATED ORAL
Qty: 90 TABLET | Refills: 2 | Status: SHIPPED | OUTPATIENT
Start: 2025-07-01

## 2025-07-01 RX ORDER — PANTOPRAZOLE SODIUM 40 MG/1
40 TABLET, DELAYED RELEASE ORAL DAILY
Status: CANCELLED | OUTPATIENT
Start: 2025-07-01

## 2025-07-01 RX ORDER — HYDROCORTISONE 25 MG/G
CREAM TOPICAL 2 TIMES DAILY
Qty: 28 G | Refills: 0 | Status: SHIPPED | OUTPATIENT
Start: 2025-07-01

## 2025-07-01 RX ORDER — LORATADINE 10 MG/1
10 TABLET ORAL DAILY
Qty: 90 TABLET | Refills: 2 | Status: CANCELLED | OUTPATIENT
Start: 2025-07-01

## 2025-07-01 RX ORDER — LOSARTAN POTASSIUM AND HYDROCHLOROTHIAZIDE 12.5; 5 MG/1; MG/1
1 TABLET ORAL DAILY
Qty: 90 TABLET | Refills: 2 | Status: CANCELLED | OUTPATIENT
Start: 2025-07-01

## 2025-07-01 RX ORDER — PROPRANOLOL HYDROCHLORIDE 60 MG/1
CAPSULE, EXTENDED RELEASE ORAL
Qty: 90 CAPSULE | Refills: 3 | Status: CANCELLED | OUTPATIENT
Start: 2025-07-01

## 2025-07-01 NOTE — TELEPHONE ENCOUNTER
Pt needs refills to hold her until annual 8/2025   Rx Refill Note  Requested Prescriptions     Pending Prescriptions Disp Refills    ondansetron (Zofran) 4 MG tablet 12 tablet 0     Sig: Take 1 tablet by mouth Every 8 (Eight) Hours As Needed for Nausea or Vomiting.      Last office visit with prescribing clinician: 9/30/2024   Last telemedicine visit with prescribing clinician: Visit date not found   Next office visit with prescribing clinician: Visit date not found                         Lourdes Campbell MA  07/01/25, 17:09 EDT

## 2025-07-01 NOTE — TELEPHONE ENCOUNTER
Rx Refill Note  Requested Prescriptions     Pending Prescriptions Disp Refills    pantoprazole (PROTONIX) 40 MG EC tablet       Sig: Take 1 tablet by mouth Daily.    rosuvastatin (CRESTOR) 40 MG tablet 90 tablet 2     Sig: Take 1 tablet by mouth every night at bedtime.    fluticasone (FLONASE) 50 MCG/ACT nasal spray 18.2 g 5     Sig: Administer 2 sprays into the nostril(s) as directed by provider Daily.    propranolol LA (INDERAL LA) 60 MG 24 hr capsule 90 capsule 3    desvenlafaxine (PRISTIQ) 100 MG 24 hr tablet 90 tablet 0     Sig: Take 1 tablet by mouth Daily.    Hydrocortisone, Perianal, (ANUSOL-HC) 2.5 % rectal cream 28 g 0     Sig: Insert  into the rectum 2 (Two) Times a Day.    losartan-hydrochlorothiazide (HYZAAR) 50-12.5 MG per tablet 90 tablet 2     Sig: Take 1 tablet by mouth Daily.    rimegepant sulfate ODT (Nurtec) 75 MG disintegrating tablet 10 tablet 2     Sig: Place 1 tablet under the tongue 1 (One) Time for 1 dose.    loratadine (Claritin) 10 MG tablet 90 tablet 2     Sig: Take 1 tablet by mouth Daily.    cyclobenzaprine (FLEXERIL) 5 MG tablet 90 tablet 0     Sig: Take 1 tablet by mouth 3 (Three) Times a Day As Needed for Muscle Spasms.      Last office visit with prescribing clinician: 12/20/2024   Last telemedicine visit with prescribing clinician: Visit date not found   Next office visit with prescribing clinician: 8/13/2025                         Would you like a call back once the refill request has been completed: [] Yes [] No    If the office needs to give you a call back, can they leave a voicemail: [] Yes [] No    Washington Mccurdy MA  07/01/25, 12:45 EDT

## 2025-07-02 RX ORDER — ONDANSETRON 4 MG/1
4 TABLET, FILM COATED ORAL EVERY 8 HOURS PRN
Qty: 12 TABLET | Refills: 0 | Status: SHIPPED | OUTPATIENT
Start: 2025-07-02

## 2025-07-03 RX ORDER — PANTOPRAZOLE SODIUM 40 MG/1
40 TABLET, DELAYED RELEASE ORAL DAILY
Qty: 90 TABLET | Refills: 2 | Status: SHIPPED | OUTPATIENT
Start: 2025-07-03

## (undated) DEVICE — LARGE HEM-O-LOK CLIP APPLIER: Brand: ENDOWRIST

## (undated) DEVICE — CANNULA SEAL

## (undated) DEVICE — PAD POSTN ARMBND 1P/U

## (undated) DEVICE — COVER,MAYO STAND,STERILE: Brand: MEDLINE

## (undated) DEVICE — PERMANENT CAUTERY HOOK: Brand: ENDOWRIST

## (undated) DEVICE — BLADELESS OBTURATOR: Brand: WECK VISTA

## (undated) DEVICE — ARM DRAPE

## (undated) DEVICE — 2, DISPOSABLE SUCTION/IRRIGATOR WITH DISPOSABLE TIP: Brand: STRYKEFLOW

## (undated) DEVICE — CVR DISP HUG U VAC STEEP TREND

## (undated) DEVICE — DRP UTIL 2/LAYR W/TP 15X26IN STRL PK/4

## (undated) DEVICE — GLV SURG PREMIERPRO MIC LTX PF SZ7.5 BRN

## (undated) DEVICE — SUT MNCRYL 4/0 PS2 18 IN

## (undated) DEVICE — PK LAP GEN 70

## (undated) DEVICE — SUT VIC 0/0 UR6 27IN DYED J603H

## (undated) DEVICE — HOLDER: Brand: DEROYAL

## (undated) DEVICE — UNDERGLV SURG BIOGEL INDICAT PF 8 GRN

## (undated) DEVICE — CADIERE FORCEPS: Brand: ENDOWRIST

## (undated) DEVICE — APPL CHLORAPREP HI/LITE 26ML ORNG

## (undated) DEVICE — ADHS SKIN PREMIERPRO EXOFIN TOPICAL HI/VISC .5ML

## (undated) DEVICE — INSUFFLATION NEEDLE TO ESTABLISH PNEUMOPERITONEUM.: Brand: INSUFFLATION NEEDLE

## (undated) DEVICE — ST TBG PNEUMOCLEAR EVAC SMOKE HIFLO